# Patient Record
Sex: MALE | Race: BLACK OR AFRICAN AMERICAN | NOT HISPANIC OR LATINO | Employment: OTHER | URBAN - METROPOLITAN AREA
[De-identification: names, ages, dates, MRNs, and addresses within clinical notes are randomized per-mention and may not be internally consistent; named-entity substitution may affect disease eponyms.]

---

## 2017-01-13 ENCOUNTER — OFFICE VISIT (OUTPATIENT)
Dept: UROLOGY | Facility: CLINIC | Age: 57
End: 2017-01-13
Payer: MEDICARE

## 2017-01-13 VITALS — DIASTOLIC BLOOD PRESSURE: 80 MMHG | SYSTOLIC BLOOD PRESSURE: 120 MMHG | HEART RATE: 71 BPM

## 2017-01-13 DIAGNOSIS — N31.9 NEUROGENIC BLADDER: Primary | ICD-10-CM

## 2017-01-13 PROCEDURE — 87088 URINE BACTERIA CULTURE: CPT

## 2017-01-13 PROCEDURE — 1159F MED LIST DOCD IN RCRD: CPT | Mod: S$GLB,,, | Performed by: UROLOGY

## 2017-01-13 PROCEDURE — 3079F DIAST BP 80-89 MM HG: CPT | Mod: S$GLB,,, | Performed by: UROLOGY

## 2017-01-13 PROCEDURE — 87086 URINE CULTURE/COLONY COUNT: CPT

## 2017-01-13 PROCEDURE — 99999 PR PBB SHADOW E&M-EST. PATIENT-LVL III: CPT | Mod: PBBFAC,,, | Performed by: UROLOGY

## 2017-01-13 PROCEDURE — 99213 OFFICE O/P EST LOW 20 MIN: CPT | Mod: S$GLB,,, | Performed by: UROLOGY

## 2017-01-13 PROCEDURE — 87186 SC STD MICRODIL/AGAR DIL: CPT

## 2017-01-13 PROCEDURE — 87077 CULTURE AEROBIC IDENTIFY: CPT

## 2017-01-13 PROCEDURE — 3074F SYST BP LT 130 MM HG: CPT | Mod: S$GLB,,, | Performed by: UROLOGY

## 2017-01-13 RX ORDER — OMEPRAZOLE 20 MG/1
CAPSULE, DELAYED RELEASE ORAL
Refills: 1 | COMMUNITY
Start: 2016-12-17

## 2017-01-13 RX ORDER — COLISTIMETHATE SODIUM 150 MG/1
INJECTION, POWDER, LYOPHILIZED, FOR SOLUTION INTRAMUSCULAR; INTRAVENOUS
COMMUNITY
Start: 2016-12-02 | End: 2017-01-13

## 2017-01-13 RX ORDER — LEVOCETIRIZINE DIHYDROCHLORIDE 5 MG/1
TABLET, FILM COATED ORAL
COMMUNITY
Start: 2017-01-10
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
## 2017-01-13 NOTE — LETTER
January 13, 2017      Eveline Parker PA-C  [address] [facility] [address]           [facility] - Urology 4th Floor  [address]  [facility] [address]  Phone: [phone]          Patient: Vicente Kapoor   MR Number: [id number]   YOB: 1960   Date of Visit: 1/13/2017       Dear Eveline Parker:    Thank you for referring Vicente Kapoor to me for evaluation. Attached you will find relevant portions of my assessment and plan of care.    If you have questions, please do not hesitate to call me. I look forward to following Vicente Kapoor along with you.    Sincerely,    Rosa Isela Ta MD    Enclosure  CC:  No Recipients    If you would like to receive this communication electronically, please contact [email or web address] or [phone] to request more information on [facility] [employer] access.    For providers and/or their staff who would like to refer a patient to [facility], please contact us through our one-stop-shop provider referral line, [facility] , at [phone].    If you feel you have received this communication in error or would no longer like to receive these types of communications, please e-mail [email or web address]

## 2017-01-16 ENCOUNTER — TELEPHONE (OUTPATIENT)
Dept: UROLOGY | Facility: CLINIC | Age: 57
End: 2017-01-16

## 2017-01-16 DIAGNOSIS — N31.9 NEUROGENIC BLADDER: Primary | ICD-10-CM

## 2017-01-16 LAB — BACTERIA UR CULT: NORMAL

## 2017-01-17 NOTE — TELEPHONE ENCOUNTER
Called the patient and he states he is asymptomatic.  He collected the urine from the leg bag.  Since he is asymptomatic, will not treat the Pseudomonas.      He is scheduled on 2/7/17 for cysto Botox injection    [facility] [phone] ext [phone]    Need to have the catheter changed on Jan 30th which is early but he needs a new catheter and a urine culture sent from the new catheter in anticipation of his procedure.

## 2017-01-20 RX ORDER — SODIUM POLYSTYRENE SULFONATE 15 G/60ML
SUSPENSION ORAL; RECTAL
Qty: 2365 ML | Refills: 0 | Status: SHIPPED | OUTPATIENT
Start: 2017-01-20 | End: 2017-04-06 | Stop reason: SDUPTHER

## 2017-01-30 ENCOUNTER — ANESTHESIA EVENT (OUTPATIENT)
Dept: SURGERY | Facility: HOSPITAL | Age: 57
End: 2017-01-30
Payer: MEDICARE

## 2017-01-30 RX ORDER — ASPIRIN 81 MG/1
81 TABLET ORAL DAILY
COMMUNITY
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
## 2017-02-06 ENCOUNTER — TELEPHONE (OUTPATIENT)
Dept: UROLOGY | Facility: CLINIC | Age: 57
End: 2017-02-06

## 2017-02-06 NOTE — TELEPHONE ENCOUNTER
Called pt to confirm arrival time of 11:30am for procedure. Gave pt NPO instructions and gave pt opportunity to ask questions. Pt verbalized understanding.

## 2017-02-07 ENCOUNTER — ANESTHESIA (OUTPATIENT)
Dept: SURGERY | Facility: HOSPITAL | Age: 57
End: 2017-02-07
Payer: MEDICARE

## 2017-02-07 ENCOUNTER — SURGERY (OUTPATIENT)
Age: 57
End: 2017-02-07

## 2017-02-07 ENCOUNTER — HOSPITAL ENCOUNTER (OUTPATIENT)
Facility: HOSPITAL | Age: 57
Discharge: HOME | End: 2017-02-07
Attending: UROLOGY | Admitting: UROLOGY
Payer: MEDICARE

## 2017-02-07 DIAGNOSIS — N31.9 NEUROGENIC BLADDER: ICD-10-CM

## 2017-02-07 LAB — POCT GLUCOSE: 132 MG/DL (ref 70–110)

## 2017-02-07 PROCEDURE — 71000044 HC DOSC ROUTINE RECOVERY FIRST HOUR: Performed by: UROLOGY

## 2017-02-07 PROCEDURE — 63600175 PHARM REV CODE 636 W HCPCS: Performed by: STUDENT IN AN ORGANIZED HEALTH CARE EDUCATION/TRAINING PROGRAM

## 2017-02-07 PROCEDURE — 63600175 PHARM REV CODE 636 W HCPCS: Performed by: NURSE ANESTHETIST, CERTIFIED REGISTERED

## 2017-02-07 PROCEDURE — 27200971 HC CYSTO SUPPLY II (SCOPE PRCDR.): Performed by: UROLOGY

## 2017-02-07 PROCEDURE — 25000003 PHARM REV CODE 250: Performed by: NURSE ANESTHETIST, CERTIFIED REGISTERED

## 2017-02-07 PROCEDURE — 52287 CYSTOSCOPY CHEMODENERVATION: CPT | Mod: ,,, | Performed by: UROLOGY

## 2017-02-07 PROCEDURE — 71000015 HC POSTOP RECOV 1ST HR: Performed by: UROLOGY

## 2017-02-07 PROCEDURE — 27200921 HC BAG, CYSTO DRAINAGE: Performed by: UROLOGY

## 2017-02-07 PROCEDURE — D9220A PRA ANESTHESIA: Mod: ANES,,, | Performed by: ANESTHESIOLOGY

## 2017-02-07 PROCEDURE — 37000009 HC ANESTHESIA EA ADD 15 MINS: Performed by: UROLOGY

## 2017-02-07 PROCEDURE — 63600175 PHARM REV CODE 636 W HCPCS: Performed by: UROLOGY

## 2017-02-07 PROCEDURE — 25000003 PHARM REV CODE 250: Performed by: STUDENT IN AN ORGANIZED HEALTH CARE EDUCATION/TRAINING PROGRAM

## 2017-02-07 PROCEDURE — 37000008 HC ANESTHESIA 1ST 15 MINUTES: Performed by: UROLOGY

## 2017-02-07 PROCEDURE — 51705 CHANGE OF BLADDER TUBE: CPT | Mod: 51,,, | Performed by: UROLOGY

## 2017-02-07 PROCEDURE — D9220A PRA ANESTHESIA: Mod: CRNA,,, | Performed by: NURSE ANESTHETIST, CERTIFIED REGISTERED

## 2017-02-07 PROCEDURE — 36000707: Performed by: UROLOGY

## 2017-02-07 PROCEDURE — 27201029: Performed by: UROLOGY

## 2017-02-07 PROCEDURE — 36000706: Performed by: UROLOGY

## 2017-02-07 RX ORDER — OXYCODONE AND ACETAMINOPHEN 10; 325 MG/1; MG/1
1 TABLET ORAL EVERY 4 HOURS PRN
Status: DISCONTINUED | OUTPATIENT
Start: 2017-02-07 | End: 2017-02-07 | Stop reason: HOSPADM

## 2017-02-07 RX ORDER — PROPOFOL 10 MG/ML
VIAL (ML) INTRAVENOUS
Status: DISCONTINUED | OUTPATIENT
Start: 2017-02-07 | End: 2017-02-07

## 2017-02-07 RX ORDER — SODIUM CHLORIDE 9 MG/ML
INJECTION, SOLUTION INTRAVENOUS CONTINUOUS PRN
Status: DISCONTINUED | OUTPATIENT
Start: 2017-02-07 | End: 2017-02-07

## 2017-02-07 RX ORDER — FENTANYL CITRATE 50 UG/ML
INJECTION, SOLUTION INTRAMUSCULAR; INTRAVENOUS
Status: DISCONTINUED | OUTPATIENT
Start: 2017-02-07 | End: 2017-02-07

## 2017-02-07 RX ORDER — OXYCODONE AND ACETAMINOPHEN 10; 325 MG/1; MG/1
TABLET ORAL
Status: DISCONTINUED
Start: 2017-02-07 | End: 2017-02-07 | Stop reason: HOSPADM

## 2017-02-07 RX ORDER — HEPARIN 100 UNIT/ML
500 SYRINGE INTRAVENOUS ONCE
Status: COMPLETED | OUTPATIENT
Start: 2017-02-07 | End: 2017-02-07

## 2017-02-07 RX ORDER — OXYCODONE AND ACETAMINOPHEN 10; 325 MG/1; MG/1
1 TABLET ORAL EVERY 4 HOURS PRN
Qty: 14 TABLET | Refills: 0 | Status: SHIPPED | OUTPATIENT
Start: 2017-02-07

## 2017-02-07 RX ORDER — MIDAZOLAM HYDROCHLORIDE 1 MG/ML
INJECTION, SOLUTION INTRAMUSCULAR; INTRAVENOUS
Status: DISCONTINUED | OUTPATIENT
Start: 2017-02-07 | End: 2017-02-07

## 2017-02-07 RX ORDER — PROPOFOL 10 MG/ML
VIAL (ML) INTRAVENOUS CONTINUOUS PRN
Status: DISCONTINUED | OUTPATIENT
Start: 2017-02-07 | End: 2017-02-07

## 2017-02-07 RX ORDER — POLYETHYLENE GLYCOL 3350 17 G/17G
17 POWDER, FOR SOLUTION ORAL DAILY
Qty: 30 PACKET | Refills: 0 | Status: SHIPPED | OUTPATIENT
Start: 2017-02-07

## 2017-02-07 RX ORDER — CIPROFLOXACIN 500 MG/1
500 TABLET ORAL 2 TIMES DAILY
Qty: 6 TABLET | Refills: 0 | Status: SHIPPED | OUTPATIENT
Start: 2017-02-07 | End: 2017-02-10

## 2017-02-07 RX ADMIN — GENTAMICIN SULFATE 240 MG: 40 INJECTION, SOLUTION INTRAMUSCULAR; INTRAVENOUS at 03:02

## 2017-02-07 RX ADMIN — PROPOFOL 125 MCG/KG/MIN: 10 INJECTION, EMULSION INTRAVENOUS at 03:02

## 2017-02-07 RX ADMIN — OXYCODONE HYDROCHLORIDE AND ACETAMINOPHEN 1 TABLET: 10; 325 TABLET ORAL at 05:02

## 2017-02-07 RX ADMIN — CEFTRIAXONE 1 G: 1 INJECTION, SOLUTION INTRAVENOUS at 03:02

## 2017-02-07 RX ADMIN — MIDAZOLAM HYDROCHLORIDE 2 MG: 1 INJECTION, SOLUTION INTRAMUSCULAR; INTRAVENOUS at 03:02

## 2017-02-07 RX ADMIN — HEPARIN 500 UNITS: 100 SYRINGE at 05:02

## 2017-02-07 RX ADMIN — ONABOTULINUMTOXINA 300 UNITS: 100 INJECTION, POWDER, LYOPHILIZED, FOR SOLUTION INTRADERMAL; INTRAMUSCULAR at 04:02

## 2017-02-07 RX ADMIN — FENTANYL CITRATE 50 MCG: 50 INJECTION, SOLUTION INTRAMUSCULAR; INTRAVENOUS at 03:02

## 2017-02-07 RX ADMIN — PROPOFOL 30 MG: 10 INJECTION, EMULSION INTRAVENOUS at 03:02

## 2017-02-07 RX ADMIN — FENTANYL CITRATE 50 MCG: 50 INJECTION, SOLUTION INTRAMUSCULAR; INTRAVENOUS at 04:02

## 2017-02-07 RX ADMIN — SODIUM CHLORIDE: 0.9 INJECTION, SOLUTION INTRAVENOUS at 03:02

## 2017-02-07 NOTE — DISCHARGE INSTRUCTIONS

## 2017-02-07 NOTE — INTERVAL H&P NOTE
The patient has been examined and the H&P has been reviewed:    I concur with the findings and no changes have occurred since H&P was written.    Anesthesia/Surgery risks, benefits and alternative options discussed and understood by patient/family.          Active Hospital Problems    Diagnosis  POA    Neurogenic bladder [N31.9]  Yes      Resolved Hospital Problems    Diagnosis Date Resolved POA   No resolved problems to display.     Patient seen in holding.  No changes in clinical condition.  Proceed with planned procedure.

## 2017-02-07 NOTE — DISCHARGE SUMMARY
"[facility]  Discharge Note  Short Stay    Admit Date: 2/7/2017    Discharge Date and Time: 02/07/2017 4:47 PM     Attending Physician: Rosa Isela Ta MD     Discharge Provider: Nomi Flower MD    Diagnoses:  Active Hospital Problems    Diagnosis  POA    *Neurogenic bladder [N31.9]  Yes    HTN (hypertension) [I10]  Yes    DM (diabetes mellitus) [E11.9]  Yes      Resolved Hospital Problems    Diagnosis Date Resolved POA   No resolved problems to display.       Discharged Condition: good    Hospital Course: Patient was admitted for cystoscopy with botox injections and tolerated the procedure well with no complications.    Final Diagnoses: Same as principal problem.    Disposition: Home or Self Care    Follow up/Patient Instructions:    Medications:  Reconciled Home Medications:   Current Discharge Medication List      START taking these medications    Details   ciprofloxacin HCl (CIPRO) 500 MG tablet Take 1 tablet (500 mg total) by mouth 2 (two) times daily.  Qty: 6 tablet, Refills: 0      !! oxycodone-acetaminophen (PERCOCET)  mg per tablet Take 1 tablet by mouth every 4 (four) hours as needed for Pain.  Qty: 14 tablet, Refills: 0      polyethylene glycol (GLYCOLAX) 17 gram PwPk Take 17 g by mouth once daily.  Qty: 30 packet, Refills: 0       !! - Potential duplicate medications found. Please discuss with provider.      CONTINUE these medications which have NOT CHANGED    Details   amlodipine (NORVASC) 10 MG tablet TK 1 T PO QD  Refills: 3      aspirin (ECOTRIN) 81 MG EC tablet Take 81 mg by mouth once daily.      baclofen (LIORESAL) 10 MG tablet TK 1 T PO BID  Refills: 1      BD ULTRA-FINE PHUONG PEN NEEDLES 32 gauge x 5/32" Ndle INJECTING TID  Refills: 2      famotidine (PEPCID) 20 MG tablet TK 1 T PO HS  Refills: 1      gabapentin (NEURONTIN) 100 MG capsule TK 3 CS PO TID  Refills: 0      hydrochlorothiazide (MICROZIDE) 12.5 mg capsule TAKE 1 CAPSULE(12.5 MG) BY MOUTH EVERY DAY  Qty: 90 " capsule, Refills: 11    Comments: **Patient requests 90 days supply**      KIONEX, WITH SORBITOL, 15-19.3 gram/60 mL Susp TAKE 60 ML BY MOUTH TWICE DAILY  Qty: 2365 mL, Refills: 0      levocetirizine (XYZAL) 5 MG tablet       MYRBETRIQ 25 mg Tb24 ER tablet TAKE 1 T PO D  Refills: 11      nitrofurantoin, macrocrystal-monohydrate, (MACROBID) 100 MG capsule TK 2 C PO D WITH FOOD.  Refills: 11      omeprazole (PRILOSEC) 20 MG capsule TK 1 C PO QD 30 MIN B JAISON  Refills: 1      !! oxycodone-acetaminophen (PERCOCET)  mg per tablet TK 1 T PO  Q 6 H PRN  Refills: 0      trazodone (DESYREL) 50 MG tablet TK 1 T PO QD HS PRF INSOMNIA  Refills: 1      oxybutynin (DITROPAN XL) 15 MG TR24 Refills: 0       !! - Potential duplicate medications found. Please discuss with provider.          Discharge Procedure Orders  Diet general     Activity as tolerated     Call MD for:  persistent nausea and vomiting or diarrhea     Call MD for:  severe uncontrolled pain     Call MD for:   Order Comments: Temperature > 101F       Follow-up Information     Follow up with Rosa Isela Ta MD In 2 weeks.    Specialty:  Urology    Why:   for f/u botox injections    Contact information:    [address]  [facility] [address] [phone]          Nomi Flower MD    Agree with the above.

## 2017-02-07 NOTE — TRANSFER OF CARE
"Anesthesia Transfer of Care Note    Patient: Vicente Kapoor    Procedure(s) Performed: Procedure(s) (LRB):  CYSTOSCOPY (N/A)  INJECTION-BOTOX (N/A)    Patient location: PACU    Anesthesia Type: general    Transport from OR: Transported from OR on room air with adequate spontaneous ventilation    Post pain: adequate analgesia    Post assessment: no apparent anesthetic complications and tolerated procedure well    Post vital signs: stable    Level of consciousness: awake, alert and oriented    Nausea/Vomiting: no nausea/vomiting    Complications: none          Last vitals:   Visit Vitals    BP (!) 146/81 (BP Location: Right arm, Patient Position: Lying, BP Method: Automatic)    Pulse 68    Temp 36.7 °C (98 °F) (Oral)    Resp 18    Ht 5' 11" (1.803 m)    Wt 74.8 kg (165 lb)    SpO2 95%    BMI 23.01 kg/m2     "

## 2017-02-07 NOTE — ANESTHESIA POSTPROCEDURE EVALUATION
"Anesthesia Post Evaluation    Patient: Vicente Kapoor    Procedure(s) Performed: Procedure(s) (LRB):  CYSTOSCOPY (N/A)  INJECTION-BOTOX (N/A)    Final Anesthesia Type: general  Patient location during evaluation: PACU  Patient participation: Yes- Able to Participate  Level of consciousness: awake and alert  Post-procedure vital signs: reviewed and stable  Pain management: adequate  Airway patency: patent  PONV status at discharge: No PONV  Anesthetic complications: no      Cardiovascular status: blood pressure returned to baseline  Respiratory status: unassisted  Hydration status: euvolemic  Follow-up not needed.        Visit Vitals    /77    Pulse 88    Temp 36.3 °C (97.3 °F) (Temporal)    Resp 20    Ht 5' 11" (1.803 m)    Wt 74.8 kg (165 lb)    SpO2 99%    BMI 23.01 kg/m2       Pain/Bryce Score: Pain Assessment Performed: Yes (2/7/2017  4:30 PM)  Presence of Pain: complains of pain/discomfort (2/7/2017  4:30 PM)  Bryce Score: 10 (2/7/2017  4:30 PM)  Modified Bryce Score: 19 (2/7/2017  4:30 PM)      "

## 2017-02-07 NOTE — IP AVS SNAPSHOT
[facility]  [address]  [facility] [address]  Phone: [phone]           Patient Discharge Instructions     Our goal is to set you up for success. This packet includes information on your condition, medications, and your home care. It will help you to care for yourself so you don't get sicker and need to go back to the hospital.     Please ask your nurse if you have any questions.        There are many details to remember when preparing to leave the hospital. Here is what you will need to do:    1. Take your medicine. If you are prescribed medications, review your Medication List in the following pages. You may have new medications to  at the pharmacy and others that you'll need to stop taking. Review the instructions for how and when to take your medications. Talk with your doctor or nurses if you are unsure of what to do.     2. Go to your follow-up appointments. Specific follow-up information is listed in the following pages. Your may be contacted by a transition nurse or clinical provider about future appointments. Be sure we have all of the phone numbers to reach you, if needed. Please contact your provider's office if you are unable to make an appointment.     3. Watch for warning signs. Your doctor or nurse will give you detailed warning signs to watch for and when to call for assistance. These instructions may also include educational information about your condition. If you experience any of warning signs to your health, call your doctor.               [facility]  Unless otherwise directed by your provider, please contact [facility], our nurse care line that is available for 24/7 assistance.     [phone] (toll-free)    Registered nurses in the [facility] provide clinical advisement, health education, appointment booking, and other advisory services.                    ** Verify the list of medication(s) below is accurate and up  to date. Carry this with you in case of emergency. If your medications have changed, please notify your healthcare provider.             Medication List      START taking these medications        Additional Info                      ciprofloxacin HCl 500 MG tablet   Commonly known as:  CIPRO   Quantity:  6 tablet   Refills:  0   Dose:  500 mg    Instructions:  Take 1 tablet (500 mg total) by mouth 2 (two) times daily.     Begin Date    AM    Noon    PM    Bedtime       polyethylene glycol 17 gram Pwpk   Commonly known as:  GLYCOLAX   Quantity:  30 packet   Refills:  0   Dose:  17 g    Instructions:  Take 17 g by mouth once daily.     Begin Date    AM    Noon    PM    Bedtime         CHANGE how you take these medications        Additional Info                      * oxycodone-acetaminophen  mg per tablet   Commonly known as:  PERCOCET   Refills:  0   What changed:  Another medication with the same name was added. Make sure you understand how and when to take each.    Last time this was given:  1 tablet on 2/7/2017  5:18 PM   Instructions:  TK 1 T PO  Q 6 H PRN     Begin Date    AM    Noon    PM    Bedtime       * oxycodone-acetaminophen  mg per tablet   Commonly known as:  PERCOCET   Quantity:  14 tablet   Refills:  0   Dose:  1 tablet   What changed:  You were already taking a medication with the same name, and this prescription was added. Make sure you understand how and when to take each.    Last time this was given:  1 tablet on 2/7/2017  5:18 PM   Instructions:  Take 1 tablet by mouth every 4 (four) hours as needed for Pain.     Begin Date    AM    Noon    PM    Bedtime       * Notice:  This list has 2 medication(s) that are the same as other medications prescribed for you. Read the directions carefully, and ask your doctor or other care provider to review them with you.      CONTINUE taking these medications        Additional Info                      amlodipine 10 MG tablet   Commonly known as:   "NORVASC   Refills:  3    Instructions:  TK 1 T PO QD     Begin Date    AM    Noon    PM    Bedtime       aspirin 81 MG EC tablet   Commonly known as:  ECOTRIN   Refills:  0   Dose:  81 mg    Instructions:  Take 81 mg by mouth once daily.     Begin Date    AM    Noon    PM    Bedtime       baclofen 10 MG tablet   Commonly known as:  LIORESAL   Refills:  1    Instructions:  TK 1 T PO BID     Begin Date    AM    Noon    PM    Bedtime       BD ULTRA-FINE PHUONG PEN NEEDLES 32 gauge x 5/32" Ndle   Refills:  2   Generic drug:  pen needle, diabetic    Instructions:  INJECTING TID     Begin Date    AM    Noon    PM    Bedtime       famotidine 20 MG tablet   Commonly known as:  PEPCID   Refills:  1    Instructions:  TK 1 T PO HS     Begin Date    AM    Noon    PM    Bedtime       gabapentin 100 MG capsule   Commonly known as:  NEURONTIN   Refills:  0    Instructions:  TK 3 CS PO TID     Begin Date    AM    Noon    PM    Bedtime       hydrochlorothiazide 12.5 mg capsule   Commonly known as:  MICROZIDE   Quantity:  90 capsule   Refills:  11   Comments:  **Patient requests 90 days supply**    Instructions:  TAKE 1 CAPSULE(12.5 MG) BY MOUTH EVERY DAY     Begin Date    AM    Noon    PM    Bedtime       KIONEX (WITH SORBITOL) 15-19.3 gram/60 mL Susp   Quantity:  2365 mL   Refills:  0   Generic drug:  sodium polystyrene sulfon-sorb    Instructions:  TAKE 60 ML BY MOUTH TWICE DAILY     Begin Date    AM    Noon    PM    Bedtime       levocetirizine 5 MG tablet   Commonly known as:  XYZAL   Refills:  0      Begin Date    AM    Noon    PM    Bedtime       MYRBETRIQ 25 mg Tb24 ER tablet   Refills:  11   Generic drug:  mirabegron    Instructions:  TAKE 1 T PO D     Begin Date    AM    Noon    PM    Bedtime       nitrofurantoin (macrocrystal-monohydrate) 100 MG capsule   Commonly known as:  MACROBID   Refills:  11    Instructions:  TK 2 C PO D WITH FOOD.     Begin Date    AM    Noon    PM    Bedtime       omeprazole 20 MG capsule   Commonly " known as:  PRILOSEC   Refills:  1    Instructions:  TK 1 C PO QD 30 MIN B JAISON     Begin Date    AM    Noon    PM    Bedtime       oxybutynin 15 MG Tr24   Commonly known as:  DITROPAN XL   Refills:  0      Begin Date    AM    Noon    PM    Bedtime       trazodone 50 MG tablet   Commonly known as:  DESYREL   Refills:  1    Instructions:  TK 1 T PO QD HS PRF INSOMNIA     Begin Date    AM    Noon    PM    Bedtime            Where to Get Your Medications      You can get these medications from any pharmacy     Bring a paper prescription for each of these medications     ciprofloxacin HCl 500 MG tablet    oxycodone-acetaminophen  mg per tablet    polyethylene glycol 17 gram Pwpk                  Please bring to all follow up appointments:    1. A copy of your discharge instructions.  2. All medicines you are currently taking in their original bottles.  3. Identification and insurance card.    Please arrive 15 minutes ahead of scheduled appointment time.    Please call 24 hours in advance if you must reschedule your appointment and/or time.        Your Scheduled Appointments     Feb 24, 2017  2:00 PM CST   Post OP with Rosa Isela Ta MD   [facility] Urology 4th Floor ([facility] )    [address]  [place] [address] [phone]              Follow-up Information     Follow up with Rosa Isela Ta MD In 2 weeks.    Specialty:  Urology    Why:   for f/u botox injections    Contact information:    [address] [facility] [address] [phone]          Discharge Instructions     Future Orders    Activity as tolerated     Call MD for:  persistent nausea and vomiting or diarrhea     Call MD for:  severe uncontrolled pain     Call MD for:     Comments:    Temperature > 101F    Diet general     Questions:    Total calories:      Fat restriction, if any:      Protein restriction, if any:      Na restriction, if any:      Fluid restriction:      Additional restrictions:          Discharge  Instructions         Cystoscopy    Cystoscopy is a procedure that lets your doctor look directly inside your urethra and bladder. It can be used to:  · Help diagnose a problem with your urethra, bladder, or kidneys.  · Take a sample (biopsy) of bladder or urethral tissue.  · Treat certain problems (such as removing kidney stones).  · Place a stent to bypass an obstruction.  · Take special X-rays of the kidneys.  Based on the findings, your doctor may recommend other tests or treatments.  What is a cystoscope?  A cystoscope is a telescope-like instrument that contains lenses and fiberoptics (small glass wires that make bright light). The cystoscope may be straight and rigid, or flexible to bend around curves in the urethra. The doctor may look directly into the cystoscope, or project the image onto a monitor.  Getting ready  · Ask your doctor if you should stop taking any medications prior to the procedure.  · Ask whether you should avoid eating or drinking anything after midnight before the procedure.  · Follow any other instructions your doctor gives you.  Tell your doctor before the exam if you:  · Take any medications, such as aspirin or blood thinners  · Have allergies to any medications  · Are pregnant   The procedure  Cystoscopy is done in the doctors office or hospital. The doctor and a nurse are present during the procedure. It takes only a few minutes, longer if a biopsy, X-ray, or treatment needs to be done.  During the procedure:  · You lie on an exam table on your back, knees bent and legs apart. You are covered with a drape.  · Your urethra and the area around it are washed. Anesthetic jelly may be applied to numb the urethra. Other pain medication is usually not needed. In some cases, you may be offered a mild sedative to help you relax. If a more extensive procedure is to be done, such as a biopsy or kidney stone removal, general anesthesia may be needed.  · The cystoscope is inserted. A sterile  "fluid is put into the bladder to expand it. You may feel pressure from this fluid.  · When the procedure is done, the cystoscope is removed.  After the procedure  If you had a sedative, general anesthesia, or spinal anesthesia, you must have someone drive you home. Once youre home:  · Drink plenty of fluids.  · You may have burning or light bleeding when you urinate--this is normal.  · Medications may be prescribed to ease any discomfort or prevent infection. Take these as directed.  · Call your doctor if you have heavy bleeding or blood clots, burning that lasts more than a day, a fever over 100°F  (38° C), or trouble urinating.              Primary Diagnosis     Your primary diagnosis was:  Neurogenic Bladder Disorder      Admission Information     Date & Time Provider Department CSN    2/7/2017 11:25 AM Rosa Isela Ta MD [facility] [id number]      Care Providers     Provider Role Specialty Primary office phone    Rosa Isela Ta MD Attending Provider Urology [phone]    Rosa Isela Ta MD Surgeon  Urology [phone]      Your Vitals Were     BP Pulse Temp Resp Height Weight    152/88 59 97.3 °F (36.3 °C) (Temporal) 18 5' 11" (1.803 m) 74.8 kg (165 lb)    SpO2 BMI             98% 23.01 kg/m2         Recent Lab Values        5/8/2008 6/20/2008 10/22/2008 2/13/2009                  7:00 PM  5:00 AM  2:44 PM  4:15 AM        A1C 6.2 6.0 6.2 6.0                 Allergies as of 2/7/2017     No Known Allergies      Advance Directives     An advance directive is a document which, in the event you are no longer able to make decisions for yourself, tells your healthcare team what kind of treatment you do or do not want to receive, or who you would like to make those decisions for you.  If you do not currently have an advance directive, [facility] encourages you to create one.  For more information call:  [phone] ([phone]), [phone] ([phone]),  or log on to " [email or web address].        Smoking Cessation     If you would like to quit smoking:   You may be eligible for free services if you are a [place] resident and started smoking cigarettes before September 1, 1988.  Call the Smoking Cessation Trust (SCT) toll free at (413) 523-1700 or (813) 455-8540.   Call 1-302-QUIT-NOW if you do not meet the above criteria.            Language Assistance Services     ATTENTION: Language assistance services are available, free of charge. Please call [phone].      ATENCIÓN: Si habla español, tiene a khan disposición servicios gratuitos de asistencia lingüística. Llame al [phone].     CHÚ Ý: N?u b?n nói Ti?ng Vi?t, có các d?ch v? h? tr? ngôn ng? mi?n phí dành cho b?n. G?i s? [phone].        Diabetes Discharge Instructions                                   [facility] Sign-Up     Activating your [facility] account is as easy as 1-2-3!     1) Visit [email or web address], select Sign Up Now, enter this activation code and your date of birth, then select Next.  [email or web address]  Expires: 3/24/2017  5:20 PM      2) Create a username and password to use when you visit [facility] in the future and select a security question in case you lose your password and select Next.    3) Enter your e-mail address and click Sign Up!    Additional Information  If you have questions, please e-mail [email or web address] or call [phone] to talk to our [facility] staff. Remember, [facility] is NOT to be used for urgent needs. For medical emergencies, dial 911.          [facility] complies with applicable Federal civil rights laws and does not discriminate on the basis of race, color, national origin, age, disability, or sex.

## 2017-02-07 NOTE — OP NOTE
[facility]  Operative Note    Date: 02/07/2017    Pre-Op Diagnosis:   1. Neurogenic bladder  2. Urge incontinence  3. Hypertension  4. DMII    Post-Op Diagnosis: same, urethrocutaneous fistula    Procedure(s) Performed:     1. Cystoscopy with bladder botox injection    Specimen(s): none    Staff Surgeon:  Dr. Rosa Isela Ta MD    Assistant Surgeon: Nomi Flower MD; Mary Jo Mott MD    Anesthesia: Monitored Local Anesthesia with Sedation    Indications: Vicente Kapoor is a 56 y.o. male with neurogenic bladder due to spinal cord injury suffered over 30 years ago. He has been experiencing painful urgency and urge incontinence and presents for bladder botox injections.     Findings:     1. Unable to pass scope into bladder through patient's urethra. 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   2. Cystoscopy through suprapubic vesicostomy revealed ureteral orifices in normal anatomic position bilaterally.  3. 300 units of botox injected throughout bladder with good wheals seen    Estimated Blood Loss: min    Drains: 24 Fr suprapubic catheter    Procedure in Detail:  After informed consent was obtained the patient was brought to the cystoscopy suite and placed in the supine position. SCDs were applied and working.  Anesthesia was administered.  When the patient was adequately sedated he was placed in the dorsal lithotomy position and prepped and draped in the usual sterile fashion.      A rigid cystoscope in a 22 Fr sheath was introduced into the patients's penile urethra. The scope was carefully advanced proximally until small dale of hair were seen and what appeared to be a urethrocutaneous fistula from the patient's urethra to a sacral decubitus ulcer. 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     We then decided to perform cystoscopy  through the patient's suprapubic vesicostomy site. The cystoscope passed easily. Bilateral ureteral orifices were seen in normal anatomic position. No bladder masses, stones or diverticuli were seen. There were mild trabeculations.     300 units of botox were injected into the detrusor muscle throughout the bladder. Good wheals were raised. The patient's bladder was drained and the cystoscope was removed. A 24 Fr suprapubic catheter was placed at procedure's end.     The patient tolerated the procedure well and was transferred to the recovery room in stable condition.      Disposition:  The patient will follow up with Dr. Ta in 2 weeks. He was given prescriptions for cipro, miralax, and percocet post-operatively.      Nomi Flower MD    I was present for the key aspects of the case and agree with the above note.

## 2017-02-07 NOTE — PLAN OF CARE
Problem: Patient Care Overview  Goal: Plan of Care Review  Outcome: Outcome(s) achieved Date Met:  02/07/17  Discharge instructions given and explained to patient and family with verbalization of understanding all instructions. Prescription given and explained next time and doses of each medication. Patients v/s stable, denies n/v and tolerating po, rates pain level tolerable, IV removed, and family at bedside for patient discharge home. Anesthesia and surgical consent in pt's chart at time of discharge.

## 2017-02-07 NOTE — ANESTHESIA RELEASE NOTE
"Anesthesia Release from PACU Note    Patient: Vicente Kapoor    Procedure(s) Performed: Procedure(s) (LRB):  CYSTOSCOPY (N/A)  INJECTION-BOTOX (N/A)    Anesthesia type: general    Post pain: Adequate analgesia    Post assessment: no apparent anesthetic complications    Last Vitals:   Visit Vitals    /77    Pulse 88    Temp 36.3 °C (97.3 °F) (Temporal)    Resp 20    Ht 5' 11" (1.803 m)    Wt 74.8 kg (165 lb)    SpO2 99%    BMI 23.01 kg/m2       Post vital signs: stable    Level of consciousness: awake    Nausea/Vomiting: no nausea/no vomiting    Complications: none    Airway Patency: patent    Respiratory: unassisted    Cardiovascular: stable and blood pressure at baseline    Hydration: euvolemic  "

## 2017-02-07 NOTE — PROGRESS NOTES
Spoke to iraj Corcoran to use patient port, difficulty in obtaining peripheral IV access. Port was accessed prior to admission.

## 2017-02-07 NOTE — PROGRESS NOTES
Called pharmacy in regards to scheduled vancomycin and gentamicin that was placed in tracker at 1153 and 1227 . Meds to be tubed as requested to station [phone].

## 2017-02-08 VITALS
BODY MASS INDEX: 23.1 KG/M2 | SYSTOLIC BLOOD PRESSURE: 150 MMHG | HEIGHT: 71 IN | RESPIRATION RATE: 19 BRPM | TEMPERATURE: 97 F | DIASTOLIC BLOOD PRESSURE: 80 MMHG | OXYGEN SATURATION: 99 % | WEIGHT: 165 LBS | HEART RATE: 60 BPM

## 2017-02-24 ENCOUNTER — OFFICE VISIT (OUTPATIENT)
Dept: UROLOGY | Facility: CLINIC | Age: 57
End: 2017-02-24
Payer: MEDICARE

## 2017-02-24 VITALS — BODY MASS INDEX: 23.1 KG/M2 | HEIGHT: 71 IN | WEIGHT: 165 LBS

## 2017-02-24 DIAGNOSIS — N31.9 NEUROGENIC BLADDER: Primary | ICD-10-CM

## 2017-02-24 DIAGNOSIS — N36.0 URETHROCUTANEOUS FISTULA IN MALE: ICD-10-CM

## 2017-02-24 DIAGNOSIS — G82.20 PARAPLEGIA AT T4 LEVEL: ICD-10-CM

## 2017-02-24 PROCEDURE — 99999 PR PBB SHADOW E&M-EST. PATIENT-LVL III: CPT | Mod: PBBFAC,,, | Performed by: UROLOGY

## 2017-02-24 PROCEDURE — 99024 POSTOP FOLLOW-UP VISIT: CPT | Mod: S$GLB,,, | Performed by: UROLOGY

## 2017-02-24 NOTE — PATIENT INSTRUCTIONS
OK to stop the Oxybutynin and Myrbetriq.  The oxybutynin can affect the way you think/have memory problems, so if you do one first, it should be the oxybutynin.  The Myrbetriq does not have the cognitive side effects.  Will plan to see you for follow up in 5 months.

## 2017-02-24 NOTE — MR AVS SNAPSHOT
"    [facility] - Urology 4th Floor  [address]  [facility] [address]  Phone: [phone]                  Vicente Kapoor   2017 2:00 PM   Office Visit    Description:  Male : 1960   Provider:  Rosa Isela Ta MD   Department:  [facility] - Urology 4th Floor           Reason for Visit     Post-op Evaluation                To Do List           Goals (5 Years of Data)     None      [facility]     [facility] On Call Nurse Care Line -  Assistance  Registered nurses in the [facility] provide clinical advisement, health education, appointment booking, and other advisory services.  Call for this free service at [phone].             Medications           Message regarding Medications     Verify the changes and/or additions to your medication regime listed below are the same as discussed with your clinician today.  If any of these changes or additions are incorrect, please notify your healthcare provider.             Verify that the below list of medications is an accurate representation of the medications you are currently taking.  If none reported, the list may be blank. If incorrect, please contact your healthcare provider. Carry this list with you in case of emergency.           Current Medications     amlodipine (NORVASC) 10 MG tablet TK 1 T PO QD    aspirin (ECOTRIN) 81 MG EC tablet Take 81 mg by mouth once daily.    baclofen (LIORESAL) 10 MG tablet TK 1 T PO BID    BD ULTRA-FINE PHUONG PEN NEEDLES 32 gauge x 5/32" Ndle INJECTING TID    famotidine (PEPCID) 20 MG tablet TK 1 T PO HS    gabapentin (NEURONTIN) 100 MG capsule TK 3 CS PO TID    hydrochlorothiazide (MICROZIDE) 12.5 mg capsule TAKE 1 CAPSULE(12.5 MG) BY MOUTH EVERY DAY    KIONEX, WITH SORBITOL, 15-19.3 gram/60 mL Susp TAKE 60 ML BY MOUTH TWICE DAILY    levocetirizine (XYZAL) 5 MG tablet     MYRBETRIQ 25 mg Tb24 ER tablet TAKE 1 T PO D    nitrofurantoin, macrocrystal-monohydrate, (MACROBID) 100 MG capsule TK 2 C PO " "D WITH FOOD.    omeprazole (PRILOSEC) 20 MG capsule TK 1 C PO QD 30 MIN B JAISON    oxybutynin (DITROPAN XL) 15 MG TR24     oxycodone-acetaminophen (PERCOCET)  mg per tablet TK 1 T PO  Q 6 H PRN    oxycodone-acetaminophen (PERCOCET)  mg per tablet Take 1 tablet by mouth every 4 (four) hours as needed for Pain.    polyethylene glycol (GLYCOLAX) 17 gram PwPk Take 17 g by mouth once daily.    trazodone (DESYREL) 50 MG tablet TK 1 T PO QD HS PRF INSOMNIA           Clinical Reference Information           Your Vitals Were     Height                   5' 11" (1.803 m)           Allergies as of 2/24/2017     No Known Allergies      Immunizations Administered on Date of Encounter - 2/24/2017     None      [facility] Sign-Up     Activating your [facility] account is as easy as 1-2-3!     1) Visit [email or web address], select Sign Up Now, enter this activation code and your date of birth, then select Next.  [email or web address]  Expires: 3/24/2017  5:20 PM      2) Create a username and password to use when you visit [facility] in the future and select a security question in case you lose your password and select Next.    3) Enter your e-mail address and click Sign Up!    Additional Information  If you have questions, please e-mail [email or web address] or call [phone] to talk to our [facility] staff. Remember, [facility] is NOT to be used for urgent needs. For medical emergencies, dial 911.         Instructions    OK to stop the Oxybutynin and Myrbetriq.  The oxybutynin can affect the way you think/have memory problems, so if you do one first, it should be the oxybutynin.  The Myrbetriq does not have the cognitive side effects.  Will plan to see you for follow up in 5 months.        Language Assistance Services     ATTENTION: Language assistance services are available, free of charge. Please call [phone].      ATENCIÓN: Si habla español, tiene a khan disposición servicios gratuitos de asistencia lingüística. Llame al " [phone].     EVON Ý: N?u b?n nói Ti?ng Vi?t, có các d?ch v? h? tr? ngôn ng? mi?n phí dành cho b?n. G?i s? [phone].         [facility] 4th [facility] complies with applicable Federal civil rights laws and does not discriminate on the basis of race, color, national origin, age, disability, or sex.

## 2017-03-09 PROBLEM — N36.0 URETHROCUTANEOUS FISTULA IN MALE: Status: ACTIVE | Noted: 2017-03-09

## 2017-03-09 PROBLEM — G82.20 PARAPLEGIA AT T4 LEVEL: Status: ACTIVE | Noted: 2017-03-09
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
## 2017-04-03 ENCOUNTER — TELEPHONE (OUTPATIENT)
Dept: UROLOGY | Facility: CLINIC | Age: 57
End: 2017-04-03

## 2017-04-03 RX ORDER — OXYBUTYNIN CHLORIDE 15 MG/1
15 TABLET, EXTENDED RELEASE ORAL DAILY
Qty: 30 TABLET | Refills: 6 | Status: SHIPPED | OUTPATIENT
Start: 2017-04-03 | End: 2017-10-27 | Stop reason: SDUPTHER

## 2017-04-03 NOTE — TELEPHONE ENCOUNTER
----- Message from Jordyn Fernando sent at 4/3/2017  9:57 AM CDT -----  Contact: Pt:[phone]  Pt states he would like to speak with  or her nurse in regard to the Botox injection.

## 2017-04-03 NOTE — TELEPHONE ENCOUNTER
Patient had called to inquire about another Botox injection.  Review of the chart reveals that he had 300 U botox on 2/7/2017.  Therefore, would not be able to re inject until May 9th.  In the mean time, the oxybutynin was refilled per pt request.

## 2017-04-06 ENCOUNTER — TELEPHONE (OUTPATIENT)
Dept: UROLOGY | Facility: CLINIC | Age: 57
End: 2017-04-06

## 2017-04-06 DIAGNOSIS — N31.9 NEUROGENIC BLADDER: Primary | ICD-10-CM

## 2017-04-06 RX ORDER — SODIUM POLYSTYRENE SULFONATE 15 G/60ML
SUSPENSION ORAL; RECTAL
Qty: 2365 ML | Refills: 0 | Status: SHIPPED | OUTPATIENT
Start: 2017-04-06 | End: 2017-06-26 | Stop reason: SDUPTHER

## 2017-04-27 ENCOUNTER — ANESTHESIA EVENT (OUTPATIENT)
Dept: SURGERY | Facility: HOSPITAL | Age: 57
End: 2017-04-27
Payer: MEDICARE
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
## 2017-05-03 ENCOUNTER — OFFICE VISIT (OUTPATIENT)
Dept: UROLOGY | Facility: CLINIC | Age: 57
End: 2017-05-03
Payer: MEDICARE

## 2017-05-03 VITALS
BODY MASS INDEX: 23.1 KG/M2 | DIASTOLIC BLOOD PRESSURE: 86 MMHG | HEART RATE: 72 BPM | HEIGHT: 71 IN | SYSTOLIC BLOOD PRESSURE: 121 MMHG | WEIGHT: 165 LBS

## 2017-05-03 DIAGNOSIS — N30.90 BLADDER INFECTION: Primary | ICD-10-CM

## 2017-05-03 DIAGNOSIS — N31.9 NEUROGENIC BLADDER: ICD-10-CM

## 2017-05-03 PROCEDURE — 99213 OFFICE O/P EST LOW 20 MIN: CPT | Mod: S$GLB,,, | Performed by: UROLOGY

## 2017-05-03 PROCEDURE — 1160F RVW MEDS BY RX/DR IN RCRD: CPT | Mod: S$GLB,,, | Performed by: UROLOGY

## 2017-05-03 PROCEDURE — 3074F SYST BP LT 130 MM HG: CPT | Mod: S$GLB,,, | Performed by: UROLOGY

## 2017-05-03 PROCEDURE — 99999 PR PBB SHADOW E&M-EST. PATIENT-LVL III: CPT | Mod: PBBFAC,,, | Performed by: UROLOGY

## 2017-05-03 PROCEDURE — 3079F DIAST BP 80-89 MM HG: CPT | Mod: S$GLB,,, | Performed by: UROLOGY

## 2017-05-03 RX ORDER — CIPROFLOXACIN 500 MG/1
500 TABLET ORAL 2 TIMES DAILY
Qty: 14 TABLET | Refills: 0 | Status: SHIPPED | OUTPATIENT
Start: 2017-05-03 | End: 2017-05-10

## 2017-05-03 NOTE — PROGRESS NOTES
CHIEF COMPLAINT:    Mr. Kapoor is a 56 y.o. male presenting for a neurogenic bladder in a patient with history of spinal cord injury    PRESENTING ILLNESS:    Vicente Kapoor is a 56 y.o. male who returns for follow up.  He was last injected on 2/7/2017 and feels the bladder spasms returning.  He had no adverse effect from the previous Botox therapy though he had some ongoing bladder spasms initially and wanted to know if the dose could be increased.  He has not had Botox for any other indication.  He will have Home health change the catheter tomorrow in anticipation for his upcoming Botox injection    REVIEW OF SYSTEMS:    Review of Systems   Constitutional: Negative.    HENT: Negative.    Eyes: Negative.    Respiratory: Negative.    Cardiovascular: Negative.    Gastrointestinal: Positive for constipation.   Genitourinary: Positive for urgency.   Musculoskeletal: Positive for back pain and joint pain.   Skin: Negative.    Neurological:        Paraplegic, uses a motorized wheel chair   Endo/Heme/Allergies: Does not bruise/bleed easily.   Psychiatric/Behavioral: Negative.      PATIENT HISTORY:    Past Medical History:   Diagnosis Date    Diabetes mellitus     Hypertension     Osteomyelitis        Past Surgical History:   Procedure Laterality Date    below the knee amputation Right     COLOSTOMY Left     PENILE PROSTHESIS  REMOVAL      PENILE PROSTHESIS IMPLANT      SUPRAPUBIC TUBE PLACEMENT  2008       History reviewed. No pertinent family history.    Social History    Marital status: Single     Social History Main Topics    Smoking status: Former Smoker    Smokeless tobacco: Not on file    Alcohol use Yes      Comment: occasionally    Drug use: No    Sexual activity: Not on file       Allergies:  Review of patient's allergies indicates no known allergies.    Medications:  Outpatient Encounter Prescriptions as of 5/3/2017   Medication Sig Dispense Refill    amlodipine (NORVASC) 10 MG tablet TK 1 T PO QD   "3    aspirin (ECOTRIN) 81 MG EC tablet Take 81 mg by mouth once daily.      baclofen (LIORESAL) 10 MG tablet TK 1 T PO BID  1    BD ULTRA-FINE PHUONG PEN NEEDLES 32 gauge x 5/32" Ndle INJECTING TID  2    ciprofloxacin HCl (CIPRO) 500 MG tablet Take 1 tablet (500 mg total) by mouth 2 (two) times daily. Take 2 hours before or after antacid, calcium, magnesium, or iron supplementation 14 tablet 0    famotidine (PEPCID) 20 MG tablet TK 1 T PO HS  1    gabapentin (NEURONTIN) 100 MG capsule TK 3 CS PO TID  0    hydrochlorothiazide (MICROZIDE) 12.5 mg capsule TAKE 1 CAPSULE(12.5 MG) BY MOUTH EVERY DAY 90 capsule 11    KIONEX, WITH SORBITOL, 15-19.3 gram/60 mL Susp SHAKE LIQUID AND TAKE 60 ML BY MOUTH TWICE DAILY 2365 mL 0    levocetirizine (XYZAL) 5 MG tablet       MYRBETRIQ 25 mg Tb24 ER tablet TAKE 1 T PO D  11    nitrofurantoin, macrocrystal-monohydrate, (MACROBID) 100 MG capsule TK 2 C PO D WITH FOOD.  11    omeprazole (PRILOSEC) 20 MG capsule TK 1 C PO QD 30 MIN B JAISON  1    oxybutynin (DITROPAN XL) 15 MG TR24 Take 1 tablet (15 mg total) by mouth once daily. 30 tablet 6    oxycodone-acetaminophen (PERCOCET)  mg per tablet TK 1 T PO  Q 6 H PRN  0    oxycodone-acetaminophen (PERCOCET)  mg per tablet Take 1 tablet by mouth every 4 (four) hours as needed for Pain. 14 tablet 0    polyethylene glycol (GLYCOLAX) 17 gram PwPk Take 17 g by mouth once daily. 30 packet 0    trazodone (DESYREL) 50 MG tablet TK 1 T PO QD HS PRF INSOMNIA  1     No facility-administered encounter medications on file as of 5/3/2017.          PHYSICAL EXAMINATION:    The patient generally appears in good health, is appropriately interactive, and is in no apparent distress.    Skin: No lesions.    Mental: Cooperative with normal affect.    Neuro: Grossly intact.    HEENT: Normal. No evidence of lymphadenopathy.    Chest: normal inspiratory effort.    Abdomen: Soft, non-tender.. No evidence of flank discomfort. No evidence of " inguinal hernia.    Extremities: No clubbing, cyanosis.  He has decreased muscle tone on the left, status post BKA on the right side.      IMPRESSION:    Encounter Diagnoses   Name Primary?    Bladder infection Yes    Neurogenic bladder        PLAN:    1. Consent signed for cystoscopy Botox injection of the bladder.  Plan 300 U as this is the limit.  Will plan to inject a little more around the trigone  2.  He will have home health change the suprapubic tube and send a culture from the new catheter  3.  cipro given empirically (the last time he had pseudomonas, sensitive to Cipro.)

## 2017-05-08 ENCOUNTER — TELEPHONE (OUTPATIENT)
Dept: UROLOGY | Facility: CLINIC | Age: 57
End: 2017-05-08

## 2017-05-08 NOTE — TELEPHONE ENCOUNTER
Called pt to confirm 5:30am arrival time for procedure. Gave pt NPO instructions and gave pt opportunity to ask questions. Pt verbalized understanding.

## 2017-05-09 ENCOUNTER — ANESTHESIA (OUTPATIENT)
Dept: SURGERY | Facility: HOSPITAL | Age: 57
End: 2017-05-09
Payer: MEDICARE

## 2017-05-09 ENCOUNTER — HOSPITAL ENCOUNTER (OUTPATIENT)
Facility: HOSPITAL | Age: 57
Discharge: HOME | End: 2017-05-09
Attending: UROLOGY | Admitting: UROLOGY
Payer: MEDICARE

## 2017-05-09 ENCOUNTER — SURGERY (OUTPATIENT)
Age: 57
End: 2017-05-09

## 2017-05-09 VITALS
RESPIRATION RATE: 18 BRPM | HEART RATE: 67 BPM | SYSTOLIC BLOOD PRESSURE: 113 MMHG | DIASTOLIC BLOOD PRESSURE: 75 MMHG | BODY MASS INDEX: 23.1 KG/M2 | WEIGHT: 165 LBS | OXYGEN SATURATION: 96 % | TEMPERATURE: 98 F | HEIGHT: 71 IN

## 2017-05-09 DIAGNOSIS — N31.9 NEUROGENIC BLADDER: ICD-10-CM

## 2017-05-09 LAB
POCT GLUCOSE: 108 MG/DL (ref 70–110)
POCT GLUCOSE: 97 MG/DL (ref 70–110)

## 2017-05-09 PROCEDURE — 37000009 HC ANESTHESIA EA ADD 15 MINS: Performed by: UROLOGY

## 2017-05-09 PROCEDURE — 25000003 PHARM REV CODE 250: Performed by: UROLOGY

## 2017-05-09 PROCEDURE — 36000707: Performed by: UROLOGY

## 2017-05-09 PROCEDURE — 82962 GLUCOSE BLOOD TEST: CPT | Performed by: UROLOGY

## 2017-05-09 PROCEDURE — 36000706: Performed by: UROLOGY

## 2017-05-09 PROCEDURE — D9220A PRA ANESTHESIA: Mod: ANES,,, | Performed by: ANESTHESIOLOGY

## 2017-05-09 PROCEDURE — 63600175 PHARM REV CODE 636 W HCPCS: Performed by: NURSE ANESTHETIST, CERTIFIED REGISTERED

## 2017-05-09 PROCEDURE — 37000008 HC ANESTHESIA 1ST 15 MINUTES: Performed by: UROLOGY

## 2017-05-09 PROCEDURE — 63600175 PHARM REV CODE 636 W HCPCS: Performed by: UROLOGY

## 2017-05-09 PROCEDURE — 52287 CYSTOSCOPY CHEMODENERVATION: CPT | Mod: ,,, | Performed by: UROLOGY

## 2017-05-09 PROCEDURE — 71000015 HC POSTOP RECOV 1ST HR: Performed by: UROLOGY

## 2017-05-09 PROCEDURE — 71000033 HC RECOVERY, INTIAL HOUR: Performed by: UROLOGY

## 2017-05-09 PROCEDURE — 25000003 PHARM REV CODE 250

## 2017-05-09 PROCEDURE — 51705 CHANGE OF BLADDER TUBE: CPT | Mod: 51,,, | Performed by: UROLOGY

## 2017-05-09 PROCEDURE — D9220A PRA ANESTHESIA: Mod: CRNA,,, | Performed by: NURSE ANESTHETIST, CERTIFIED REGISTERED

## 2017-05-09 RX ORDER — MIDAZOLAM HYDROCHLORIDE 1 MG/ML
INJECTION, SOLUTION INTRAMUSCULAR; INTRAVENOUS
Status: DISCONTINUED | OUTPATIENT
Start: 2017-05-09 | End: 2017-05-09

## 2017-05-09 RX ORDER — HEPARIN 100 UNIT/ML
5 SYRINGE INTRAVENOUS ONCE
Status: COMPLETED | OUTPATIENT
Start: 2017-05-09 | End: 2017-05-09

## 2017-05-09 RX ORDER — FENTANYL CITRATE 50 UG/ML
INJECTION, SOLUTION INTRAMUSCULAR; INTRAVENOUS
Status: DISCONTINUED | OUTPATIENT
Start: 2017-05-09 | End: 2017-05-09

## 2017-05-09 RX ORDER — SODIUM CHLORIDE 9 MG/ML
INJECTION, SOLUTION INTRAVENOUS CONTINUOUS
Status: DISCONTINUED | OUTPATIENT
Start: 2017-05-09 | End: 2017-05-09 | Stop reason: HOSPADM

## 2017-05-09 RX ORDER — LIDOCAINE HYDROCHLORIDE 10 MG/ML
1 INJECTION, SOLUTION EPIDURAL; INFILTRATION; INTRACAUDAL; PERINEURAL ONCE
Status: DISCONTINUED | OUTPATIENT
Start: 2017-05-09 | End: 2017-05-09 | Stop reason: HOSPADM

## 2017-05-09 RX ORDER — PROPOFOL 10 MG/ML
VIAL (ML) INTRAVENOUS CONTINUOUS PRN
Status: DISCONTINUED | OUTPATIENT
Start: 2017-05-09 | End: 2017-05-09

## 2017-05-09 RX ORDER — HEPARIN 100 UNIT/ML
SYRINGE INTRAVENOUS
Status: COMPLETED
Start: 2017-05-09 | End: 2017-05-09

## 2017-05-09 RX ORDER — LIDOCAINE HYDROCHLORIDE 10 MG/ML
INJECTION, SOLUTION EPIDURAL; INFILTRATION; INTRACAUDAL; PERINEURAL
Status: DISCONTINUED
Start: 2017-05-09 | End: 2017-05-09 | Stop reason: HOSPADM

## 2017-05-09 RX ADMIN — FENTANYL CITRATE 50 MCG: 50 INJECTION, SOLUTION INTRAMUSCULAR; INTRAVENOUS at 07:05

## 2017-05-09 RX ADMIN — HEPARIN 500 UNITS: 100 SYRINGE at 08:05

## 2017-05-09 RX ADMIN — CEFTRIAXONE 1 G: 1 INJECTION, SOLUTION INTRAVENOUS at 06:05

## 2017-05-09 RX ADMIN — SODIUM CHLORIDE: 0.9 INJECTION, SOLUTION INTRAVENOUS at 06:05

## 2017-05-09 RX ADMIN — ONABOTULINUMTOXINA 300 UNITS: 100 INJECTION, POWDER, LYOPHILIZED, FOR SOLUTION INTRADERMAL; INTRAMUSCULAR at 07:05

## 2017-05-09 RX ADMIN — GENTAMICIN SULFATE 160 MG: 40 INJECTION, SOLUTION INTRAMUSCULAR; INTRAVENOUS at 07:05

## 2017-05-09 RX ADMIN — MIDAZOLAM HYDROCHLORIDE 2 MG: 1 INJECTION, SOLUTION INTRAMUSCULAR; INTRAVENOUS at 06:05

## 2017-05-09 RX ADMIN — PROPOFOL 100 MCG/KG/MIN: 10 INJECTION, EMULSION INTRAVENOUS at 07:05

## 2017-05-09 NOTE — ANESTHESIA RELEASE NOTE
"Anesthesia Release from PACU Note    Patient: Vicente Kapoor    Procedure(s) Performed: Procedure(s) (LRB):  CYSTOSCOPY (N/A)  INJECTION-BOTOX (N/A)  INSERTION-CATHETER-SUPRAPUBIC (N/A)    Anesthesia type: General    Post pain: Adequate analgesia    Post assessment: no apparent anesthetic complications, tolerated procedure well and no evidence of recall    Last Vitals:   Visit Vitals    /65    Pulse 86    Temp 36.9 °C (98.4 °F) (Temporal)    Resp 16    Ht 5' 11" (1.803 m)    Wt 74.8 kg (165 lb)    SpO2 95%    BMI 23.01 kg/m2       Post vital signs: stable    Level of consciousness: awake, alert  and oriented    Nausea/Vomiting: no nausea/no vomiting    Complications: none    Airway Patency: patent    Respiratory: unassisted    Cardiovascular: stable and blood pressure at baseline    Hydration: euvolemic  "
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
## 2017-05-09 NOTE — IP AVS SNAPSHOT
[facility]  [address]  [facility] [address]  Phone: [phone]           Patient Discharge Instructions   Our goal is to set you up for success. This packet includes information on your condition, medications, and your home care.  It will help you care for yourself to prevent having to return to the hospital.     Please ask your nurse if you have any questions.      There are many details to remember when preparing to leave the hospital. Here is what you will need to do:    1. Take your medicine. If you are prescribed medications, review your Medication List on the following pages. You may have new medications to  at the pharmacy and others that you'll need to stop taking. Review the instructions for how and when to take your medications. Talk with your doctor or nurses if you are unsure of what to do.     2. Go to your follow-up appointments. Specific follow-up information is listed in the following pages. Your may be contacted by a nurse or clinical provider about future appointments. Be sure we have all of the phone numbers to reach you. Please contact your provider's office if you are unable to make an appointment.     3. Watch for warning signs. Your doctor or nurse will give you detailed warning signs to watch for and when to call for assistance. These instructions may also include educational information about your condition. If you experience any of warning signs to your health, call your doctor.           [facility]  Unless otherwise directed by your provider, please   contact [facility], our nurse care line   that is available for 24/7 assistance.     [phone] (toll-free)     Registered nurses in the [facility]   provide: appointment scheduling, clinical advisement, health education, and other advisory services.                  ** Verify the list of medication(s) below is accurate and up to date. Carry this with you in case of  "emergency. If your medications have changed, please notify your healthcare provider.             Medication List      CONTINUE taking these medications        Additional Info                      amlodipine 10 MG tablet   Commonly known as:  NORVASC   Refills:  3    Instructions:  TK 1 T PO QD     Begin Date    AM    Noon    PM    Bedtime       aspirin 81 MG EC tablet   Commonly known as:  ECOTRIN   Refills:  0   Dose:  81 mg    Instructions:  Take 81 mg by mouth once daily.     Begin Date    AM    Noon    PM    Bedtime       baclofen 10 MG tablet   Commonly known as:  LIORESAL   Refills:  1    Instructions:  TK 1 T PO BID     Begin Date    AM    Noon    PM    Bedtime       BD ULTRA-FINE PHUONG PEN NEEDLES 32 gauge x 5/32" Ndle   Refills:  2   Generic drug:  pen needle, diabetic    Instructions:  INJECTING TID     Begin Date    AM    Noon    PM    Bedtime       ciprofloxacin HCl 500 MG tablet   Commonly known as:  CIPRO   Quantity:  14 tablet   Refills:  0   Dose:  500 mg    Instructions:  Take 1 tablet (500 mg total) by mouth 2 (two) times daily. Take 2 hours before or after antacid, calcium, magnesium, or iron supplementation     Begin Date    AM    Noon    PM    Bedtime       famotidine 20 MG tablet   Commonly known as:  PEPCID   Refills:  1    Instructions:  TK 1 T PO HS     Begin Date    AM    Noon    PM    Bedtime       gabapentin 100 MG capsule   Commonly known as:  NEURONTIN   Refills:  0    Instructions:  TK 3 CS PO TID     Begin Date    AM    Noon    PM    Bedtime       hydrochlorothiazide 12.5 mg capsule   Commonly known as:  MICROZIDE   Quantity:  90 capsule   Refills:  11   Comments:  **Patient requests 90 days supply**    Instructions:  TAKE 1 CAPSULE(12.5 MG) BY MOUTH EVERY DAY     Begin Date    AM    Noon    PM    Bedtime       KIONEX (WITH SORBITOL) 15-19.3 gram/60 mL Susp   Quantity:  2365 mL   Refills:  0   Generic drug:  sodium polystyrene sulfon-sorb    Instructions:  SHAKE LIQUID AND TAKE 60 ML " BY MOUTH TWICE DAILY     Begin Date    AM    Noon    PM    Bedtime       levocetirizine 5 MG tablet   Commonly known as:  XYZAL   Refills:  0      Begin Date    AM    Noon    PM    Bedtime       MYRBETRIQ 25 mg Tb24 ER tablet   Refills:  11   Generic drug:  mirabegron    Instructions:  TAKE 1 T PO D     Begin Date    AM    Noon    PM    Bedtime       nitrofurantoin (macrocrystal-monohydrate) 100 MG capsule   Commonly known as:  MACROBID   Refills:  11    Instructions:  TK 2 C PO D WITH FOOD.     Begin Date    AM    Noon    PM    Bedtime       omeprazole 20 MG capsule   Commonly known as:  PRILOSEC   Refills:  1    Instructions:  TK 1 C PO QD 30 MIN B JAISON     Begin Date    AM    Noon    PM    Bedtime       oxybutynin 15 MG Tr24   Commonly known as:  DITROPAN XL   Quantity:  30 tablet   Refills:  6   Dose:  15 mg    Instructions:  Take 1 tablet (15 mg total) by mouth once daily.     Begin Date    AM    Noon    PM    Bedtime       * oxycodone-acetaminophen  mg per tablet   Commonly known as:  PERCOCET   Refills:  0    Instructions:  TK 1 T PO  Q 6 H PRN     Begin Date    AM    Noon    PM    Bedtime       * oxycodone-acetaminophen  mg per tablet   Commonly known as:  PERCOCET   Quantity:  14 tablet   Refills:  0   Dose:  1 tablet    Instructions:  Take 1 tablet by mouth every 4 (four) hours as needed for Pain.     Begin Date    AM    Noon    PM    Bedtime       polyethylene glycol 17 gram Pwpk   Commonly known as:  GLYCOLAX   Quantity:  30 packet   Refills:  0   Dose:  17 g    Instructions:  Take 17 g by mouth once daily.     Begin Date    AM    Noon    PM    Bedtime       trazodone 50 MG tablet   Commonly known as:  DESYREL   Refills:  1    Instructions:  TK 1 T PO QD HS PRF INSOMNIA     Begin Date    AM    Noon    PM    Bedtime       * Notice:  This list has 2 medication(s) that are the same as other medications prescribed for you. Read the directions carefully, and ask your doctor or other care provider  to review them with you.               Please bring to all follow up appointments:    1. A copy of your discharge instructions.  2. All medicines you are currently taking in their original bottles.  3. Identification and insurance card.    Please arrive 15 minutes ahead of scheduled appointment time.    Please call 24 hours in advance if you must reschedule your appointment and/or time.        Your Scheduled Appointments     Jun 14, 2017  1:00 PM CDT   Post OP with Rosa Isela Ta MD   [facility] - Urology 4th Floor ([facility] [address] )    [address]  [facility] [address]   [phone]              Follow-up Information     Schedule an appointment as soon as possible for a visit with Rosa Isela Ta MD.    Specialty:  Urology    Why:  As needed    Contact information:    [address]  [facility] [address]  [phone]          Discharge Instructions     Future Orders    Activity as tolerated     Call MD for:  persistent nausea and vomiting or diarrhea     Call MD for:  severe uncontrolled pain     Call MD for:  temperature >100.4     Diet general     Questions:    Total calories:      Fat restriction, if any:      Protein restriction, if any:      Na restriction, if any:      Fluid restriction:      Additional restrictions:          Discharge Instructions         Cystoscopy    Cystoscopy is a procedure that lets your doctor look directly inside your urethra and bladder. It can be used to:  · Help diagnose a problem with your urethra, bladder, or kidneys.  · Take a sample (biopsy) of bladder or urethral tissue.  · Treat certain problems (such as removing kidney stones).  · Place a stent to bypass an obstruction.  · Take special X-rays of the kidneys.  Based on the findings, your doctor may recommend other tests or treatments.  What is a cystoscope?  A cystoscope is a telescope-like instrument that contains lenses and fiberoptics (small glass wires that make bright light). The cystoscope may  be straight and rigid, or flexible to bend around curves in the urethra. The doctor may look directly into the cystoscope, or project the image onto a monitor.  Getting ready  · Ask your doctor if you should stop taking any medications prior to the procedure.  · Ask whether you should avoid eating or drinking anything after midnight before the procedure.  · Follow any other instructions your doctor gives you.  Tell your doctor before the exam if you:  · Take any medications, such as aspirin or blood thinners  · Have allergies to any medications  · Are pregnant   The procedure  Cystoscopy is done in the doctors office or hospital. The doctor and a nurse are present during the procedure. It takes only a few minutes, longer if a biopsy, X-ray, or treatment needs to be done.  During the procedure:  · You lie on an exam table on your back, knees bent and legs apart. You are covered with a drape.  · Your urethra and the area around it are washed. Anesthetic jelly may be applied to numb the urethra. Other pain medication is usually not needed. In some cases, you may be offered a mild sedative to help you relax. If a more extensive procedure is to be done, such as a biopsy or kidney stone removal, general anesthesia may be needed.  · The cystoscope is inserted. A sterile fluid is put into the bladder to expand it. You may feel pressure from this fluid.  · When the procedure is done, the cystoscope is removed.  After the procedure  If you had a sedative, general anesthesia, or spinal anesthesia, you must have someone drive you home. Once youre home:  · Drink plenty of fluids.  · You may have burning or light bleeding when you urinate--this is normal.  · Medications may be prescribed to ease any discomfort or prevent infection. Take these as directed.  · Call your doctor if you have heavy bleeding or blood clots, burning that lasts more than a day, a fever over 100°F  (38° C), or trouble urinating.  Date Last Reviewed:  "9/8/2014  © 7626-7188 [employer]. [address] [place] [address], [place] [address]. All rights reserved. This information is not intended as a substitute for professional medical care. Always follow your healthcare professional's instructions.            Primary Diagnosis     Your primary diagnosis was:  Neurogenic Bladder Disorder      Admission Information     Date & Time Provider Department CSN    5/9/2017  5:31 AM Rosa Isela Ta MD [facility] [id number]      Care Providers     Provider Role Specialty Primary office phone    Rosa Isela Ta MD Attending Provider Urology [phone]    Rosa Isela Ta MD Surgeon  Urology [phone]      Your Vitals Were     BP Pulse Temp Resp Height Weight    109/65 86 98.4 °F (36.9 °C) (Temporal) 16 5' 11" (1.803 m) 74.8 kg (165 lb)    SpO2 BMI             95% 23.01 kg/m2         Recent Lab Values        5/8/2008 6/20/2008 10/22/2008 2/13/2009                  7:00 PM  5:00 AM  2:44 PM  4:15 AM        A1C 6.2 6.0 6.2 6.0                 Allergies as of 5/9/2017     No Known Allergies      Advance Directives     An advance directive is a document which, in the event you are no longer able to make decisions for yourself, tells your healthcare team what kind of treatment you do or do not want to receive, or who you would like to make those decisions for you.  If you do not currently have an advance directive, [facility] encourages you to create one.  For more information call:  [phone] ([phone]), [phone] ([phone]),  or log on to [email or web address].        Smoking Cessation     If you would like to quit smoking:   You may be eligible for free services if you are a [place] resident and started smoking cigarettes before September 1, 1988.  Call the Smoking Cessation Trust (SCT) toll free at (690) 855-3640 or (897) 910-6247.   Call 1-800-QUIT-NOW if you do not meet the above criteria.   Contact us via email: " [email or web address]   View our website for more information: [email or web address]        Language Assistance Services     ATTENTION: Language assistance services are available, free of charge. Please call [phone].      ATENCIÓN: Si habla chuck, tiene a khan disposición servicios gratuitos de asistencia lingüística. Llame al [phone].     CHÚ Ý: N?u b?n nói Ti?ng Vi?t, có các d?ch v? h? tr? ngôn ng? mi?n phí dành cho b?n. G?i s? [phone].        Diabetes Discharge Instructions                                    [facility] complies with applicable Federal civil rights laws and does not discriminate on the basis of race, color, national origin, age, disability, or sex.

## 2017-05-09 NOTE — DISCHARGE SUMMARY
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       Discharged Condition: good    Hospital Course: Patient was admitted for an outpatient procedure and tolerated the procedure well with no complications.    Final Diagnoses: Same as principal problem.    Disposition: Home or Self Care    Follow up/Patient Instructions:    Medications:  Reconciled Home Medications:   Current Discharge Medication List      CONTINUE these medications which have NOT CHANGED    Details   amlodipine (NORVASC) 10 MG tablet TK 1 T PO QD  Refills: 3      baclofen (LIORESAL) 10 MG tablet TK 1 T PO BID  Refills: 1      ciprofloxacin HCl (CIPRO) 500 MG tablet Take 1 tablet (500 mg total) by mouth 2 (two) times daily. Take 2 hours before or after antacid, calcium, magnesium, or iron supplementation  Qty: 14 tablet, Refills: 0    Associated Diagnoses: Bladder infection; Neurogenic bladder      famotidine (PEPCID) 20 MG tablet TK 1 T PO HS  Refills: 1      hydrochlorothiazide (MICROZIDE) 12.5 mg capsule TAKE 1 CAPSULE(12.5 MG) BY MOUTH EVERY DAY  Qty: 90 capsule, Refills: 11    Comments: **Patient requests 90 days supply**      KIONEX, WITH SORBITOL, 15-19.3 gram/60 mL Susp SHAKE LIQUID AND TAKE 60 ML BY MOUTH TWICE DAILY  Qty: 2365 mL, Refills: 0      omeprazole (PRILOSEC) 20 MG capsule TK 1 C PO QD 30 MIN B JAISON  Refills: 1      oxybutynin (DITROPAN XL) 15 MG TR24 Take 1 tablet (15 mg total) by mouth once daily.  Qty: 30 tablet, Refills: 6      !! oxycodone-acetaminophen (PERCOCET)  mg per tablet TK 1 T PO  Q 6 H  "PRN  Refills: 0      trazodone (DESYREL) 50 MG tablet TK 1 T PO QD HS PRF INSOMNIA  Refills: 1      aspirin (ECOTRIN) 81 MG EC tablet Take 81 mg by mouth once daily.      BD ULTRA-FINE PHUONG PEN NEEDLES 32 gauge x 5/32" Ndle INJECTING TID  Refills: 2      gabapentin (NEURONTIN) 100 MG capsule TK 3 CS PO TID  Refills: 0      levocetirizine (XYZAL) 5 MG tablet       MYRBETRIQ 25 mg Tb24 ER tablet TAKE 1 T PO D  Refills: 11      nitrofurantoin, macrocrystal-monohydrate, (MACROBID) 100 MG capsule TK 2 C PO D WITH FOOD.  Refills: 11      !! oxycodone-acetaminophen (PERCOCET)  mg per tablet Take 1 tablet by mouth every 4 (four) hours as needed for Pain.  Qty: 14 tablet, Refills: 0      polyethylene glycol (GLYCOLAX) 17 gram PwPk Take 17 g by mouth once daily.  Qty: 30 packet, Refills: 0       !! - Potential duplicate medications found. Please discuss with provider.          Discharge Procedure Orders  Diet general     Activity as tolerated     Call MD for:  temperature >100.4     Call MD for:  persistent nausea and vomiting or diarrhea     Call MD for:  severe uncontrolled pain       Follow-up Information     Schedule an appointment as soon as possible for a visit with Rosa Isela Ta MD.    Specialty:  Urology    Why:  As needed    Contact information:    [address]  [facility] [address]  [phone]          As above.   "

## 2017-05-09 NOTE — INTERVAL H&P NOTE
The patient has been examined and the H&P has been reviewed:    I concur with the findings and no changes have occurred since H&P was written.   UCx from SP tube no growth per patient report    Anesthesia/Surgery risks, benefits and alternative options discussed and understood by patient/family.          Active Hospital Problems    Diagnosis  POA    Neurogenic bladder [N31.9]  Yes      Resolved Hospital Problems    Diagnosis Date Resolved POA   No resolved problems to display.     Patient's urine culture showed no growth.  Proceed with planned procedure.

## 2017-05-09 NOTE — PLAN OF CARE
Pt  given dc instructions and verbalized understanding.   0815- CN called Pt ride. Stated unable to  pt. States needs to bring her dad to a MRI. Will try to push back dad MRI. Will call back to notify. PT aaox3 and in no distress.   0834- Pt cousin called back to notify will come  at 0930.

## 2017-05-09 NOTE — TRANSFER OF CARE
"Anesthesia Transfer of Care Note    Patient: Vicente Kapoor    Procedure(s) Performed: Procedure(s) (LRB):  CYSTOSCOPY (N/A)  INJECTION-BOTOX (N/A)  INSERTION-CATHETER-SUPRAPUBIC (N/A)    Patient location: PACU    Transport from OR: Transported from OR on room air with adequate spontaneous ventilation    Post pain: adequate analgesia    Post assessment: no apparent anesthetic complications    Post vital signs: stable    Level of consciousness: awake and sedated    Nausea/Vomiting: no nausea/vomiting    Complications: none          Last vitals:   Visit Vitals    /78    Pulse (!) 59    Temp 36.9 °C (98.4 °F) (Temporal)    Resp 16    Ht 5' 11" (1.803 m)    Wt 74.8 kg (165 lb)    SpO2 99%    BMI 23.01 kg/m2     "

## 2017-05-09 NOTE — ANESTHESIA POSTPROCEDURE EVALUATION
"Anesthesia Post Evaluation    Patient: Vicente Kapoor    Procedure(s) Performed: Procedure(s) (LRB):  CYSTOSCOPY (N/A)  INJECTION-BOTOX (N/A)  INSERTION-CATHETER-SUPRAPUBIC (N/A)    Final Anesthesia Type: general  Patient location during evaluation: PACU  Patient participation: Yes- Able to Participate  Level of consciousness: awake and alert  Post-procedure vital signs: reviewed and stable  Pain management: adequate  Airway patency: patent  PONV status at discharge: No PONV  Anesthetic complications: no      Cardiovascular status: stable  Respiratory status: unassisted and spontaneous ventilation  Hydration status: euvolemic  Follow-up not needed.        Visit Vitals    /65    Pulse 86    Temp 36.9 °C (98.4 °F) (Temporal)    Resp 16    Ht 5' 11" (1.803 m)    Wt 74.8 kg (165 lb)    SpO2 95%    BMI 23.01 kg/m2       Pain/Bryce Score: Pain Assessment Performed: Yes (5/9/2017  7:48 AM)  Presence of Pain: denies (5/9/2017  7:48 AM)  Bryce Score: 9 (5/9/2017  7:48 AM)      " 0

## 2017-05-09 NOTE — DISCHARGE INSTRUCTIONS

## 2017-06-14 ENCOUNTER — OFFICE VISIT (OUTPATIENT)
Dept: UROLOGY | Facility: CLINIC | Age: 57
End: 2017-06-14
Payer: MEDICARE

## 2017-06-14 VITALS
BODY MASS INDEX: 23.8 KG/M2 | WEIGHT: 170 LBS | HEART RATE: 96 BPM | HEIGHT: 71 IN | SYSTOLIC BLOOD PRESSURE: 149 MMHG | DIASTOLIC BLOOD PRESSURE: 93 MMHG

## 2017-06-14 DIAGNOSIS — N39.41 URGE INCONTINENCE: Primary | ICD-10-CM

## 2017-06-14 DIAGNOSIS — N31.9 NEUROGENIC BLADDER: ICD-10-CM

## 2017-06-14 PROCEDURE — 99213 OFFICE O/P EST LOW 20 MIN: CPT | Mod: S$GLB,,, | Performed by: UROLOGY

## 2017-06-14 PROCEDURE — 99999 PR PBB SHADOW E&M-EST. PATIENT-LVL III: CPT | Mod: PBBFAC,,, | Performed by: UROLOGY

## 2017-06-14 NOTE — PROGRESS NOTES
CHIEF COMPLAINT:    Mr. Kapoor is a 56 y.o. male presenting for a follow up after cystoscopy Botox injection 300 U 5/9/2017.    PRESENTING ILLNESS:    Vicente Kapoor is a 56 y.o. male who returns stating that he had an episode of urinary incontinence per urethra.  He believes that this is due to the suprapubic tube getting kinked.  He continues on oxybutynin Xl 15 mg and Myrbetriq 25 mg.  His bp was elevated today but he did not take his meds since he did not eat.  Normally it is well controlled on medication.  Urinary symptoms are about 60% improved.      REVIEW OF SYSTEMS:    Review of Systems   Constitutional: Negative.    HENT: Negative.    Eyes: Negative.    Respiratory: Negative.    Cardiovascular: Negative.    Gastrointestinal: Positive for constipation.   Genitourinary:        Urge incontinence   Musculoskeletal: Positive for back pain.   Skin: Negative.    Neurological:        Paraplegic, spinal cord injury   Endo/Heme/Allergies: Negative.    Psychiatric/Behavioral: Negative.      PATIENT HISTORY:    Past Medical History:   Diagnosis Date    Diabetes mellitus     Hypertension     Neurogenic bladder     Osteomyelitis        Past Surgical History:   Procedure Laterality Date    below the knee amputation Right     COLOSTOMY Left     CYSTOSCOPY      PENILE PROSTHESIS  REMOVAL      PENILE PROSTHESIS IMPLANT      SUPRAPUBIC TUBE PLACEMENT  2008     No family history on file.    Social History    Marital status: Single     Social History Main Topics    Smoking status: Former Smoker     Packs/day: 0.50     Years: 35.00     Types: Cigarettes     Start date: 6/4/2016    Smokeless tobacco: Not on file    Alcohol use Yes      Comment: occasionally    Drug use: No    Sexual activity: Not on file       Allergies:  Review of patient's allergies indicates no known allergies.    Medications:  Outpatient Encounter Prescriptions as of 6/14/2017   Medication Sig Dispense Refill    aspirin (ECOTRIN) 81 MG EC  "tablet Take 81 mg by mouth once daily.      baclofen (LIORESAL) 10 MG tablet TK 1 T PO BID  1    BD ULTRA-FINE PHUONG PEN NEEDLES 32 gauge x 5/32" Ndle INJECTING TID  2    famotidine (PEPCID) 20 MG tablet TK 1 T PO HS  1    hydrochlorothiazide (MICROZIDE) 12.5 mg capsule TAKE 1 CAPSULE(12.5 MG) BY MOUTH EVERY DAY 90 capsule 11    KIONEX, WITH SORBITOL, 15-19.3 gram/60 mL Susp SHAKE LIQUID AND TAKE 60 ML BY MOUTH TWICE DAILY 2365 mL 0    levocetirizine (XYZAL) 5 MG tablet       nitrofurantoin, macrocrystal-monohydrate, (MACROBID) 100 MG capsule TK 2 C PO D WITH FOOD.  11    omeprazole (PRILOSEC) 20 MG capsule TK 1 C PO QD 30 MIN B JASION  1    oxybutynin (DITROPAN XL) 15 MG TR24 Take 1 tablet (15 mg total) by mouth once daily. 30 tablet 6    oxycodone-acetaminophen (PERCOCET)  mg per tablet TK 1 T PO  Q 6 H PRN  0    oxycodone-acetaminophen (PERCOCET)  mg per tablet Take 1 tablet by mouth every 4 (four) hours as needed for Pain. 14 tablet 0    amlodipine (NORVASC) 10 MG tablet TK 1 T PO QD  3    gabapentin (NEURONTIN) 100 MG capsule TK 3 CS PO TID  0    mirabegron (MYRBETRIQ) 50 mg Tb24 Take 1 tablet (50 mg total) by mouth once daily. 30 tablet 11    polyethylene glycol (GLYCOLAX) 17 gram PwPk Take 17 g by mouth once daily. 30 packet 0    trazodone (DESYREL) 50 MG tablet TK 1 T PO QD HS PRF INSOMNIA  1     No facility-administered encounter medications on file as of 6/14/2017.          PHYSICAL EXAMINATION:    The patient generally appears in good health, is appropriately interactive, and is in no apparent distress.    Skin: No lesions.    Mental: Cooperative with normal affect.    Neuro: Grossly intact.    HEENT: Normal. No evidence of lymphadenopathy.    Chest:  normal inspiratory effort.    Abdomen: Soft, non-tender. No masses or organomegaly. Bladder is not palpable. No evidence of flank discomfort. No evidence of inguinal hernia.    Extremities: No clubbing, cyanosis, or " edema      LABS:    Lab Results   Component Value Date    BUN 21 (H) 05/17/2016    CREATININE 0.7 05/17/2016     IMPRESSION:    Encounter Diagnoses   Name Primary?    Urge incontinence Yes    Neurogenic bladder        PLAN:    1.   Increased the dose of Myrbetriq from 25 to 50 mg  2.  He is already on oxybutynin 15 mg XL  3.  He will pay attention to the tube as it may be the tubing of the s/p tube getting kinked and not allowing the bladder to drain.    4.  Follow up when he needs the next Botox injection.

## 2017-06-27 RX ORDER — SODIUM POLYSTYRENE SULFONATE 15 G/60ML
SUSPENSION ORAL; RECTAL
Qty: 2365 ML | Refills: 0 | Status: SHIPPED | OUTPATIENT
Start: 2017-06-27

## 2017-07-21 RX ORDER — HYDROCHLOROTHIAZIDE 12.5 MG/1
CAPSULE ORAL
Qty: 30 CAPSULE | Refills: 0 | OUTPATIENT
Start: 2017-07-21

## 2017-08-29 ENCOUNTER — TELEPHONE (OUTPATIENT)
Dept: UROLOGY | Facility: CLINIC | Age: 57
End: 2017-08-29

## 2017-09-20 RX ORDER — SODIUM POLYSTYRENE SULFONATE 15 G/60ML
SUSPENSION ORAL; RECTAL
Qty: 2,365 ML | Refills: 0 | Status: CANCELLED | OUTPATIENT
Start: 2017-09-20

## 2017-09-21 RX ORDER — SODIUM POLYSTYRENE SULFONATE 15 G/60ML
SUSPENSION ORAL; RECTAL
Qty: 2,365 ML | Refills: 0 | Status: CANCELLED | OUTPATIENT
Start: 2017-09-21

## 2017-09-26 DIAGNOSIS — E87.5 HYPERKALEMIA: Primary | ICD-10-CM

## 2017-10-06 DIAGNOSIS — E87.5 HYPERKALEMIA: Primary | ICD-10-CM

## 2017-10-10 ENCOUNTER — TELEPHONE (OUTPATIENT)
Dept: NEPHROLOGY | Facility: CLINIC | Age: 57
End: 2017-10-10

## 2017-10-10 NOTE — TELEPHONE ENCOUNTER
Called pt informed that Dr. Suarez would like for him to go to the ED for K+ 6.7 pt stated that his was taking kayexalate once daily and only for 2 days pre labs  , I spoke with Dr. Suarez about this and stated that he would prefer pt go to  the ED but pt can try taking kayexalate twice daily with plenty fluids ,pt agreed to this and stated if he starts to feel bad he will go to ED

## 2017-10-20 ENCOUNTER — TELEPHONE (OUTPATIENT)
Dept: NEPHROLOGY | Facility: CLINIC | Age: 57
End: 2017-10-20

## 2017-10-20 NOTE — TELEPHONE ENCOUNTER
----- Message from Divya Alvarez sent at 10/20/2017  1:21 PM CDT -----  Contact: pt  x_ 1st Request  _ 2nd Request  _ 3rd Request    Who: pt    Why: is returning a call from staff    What Number to Call Back: [phone]    When to Expect a call back: (Before the end of the day)  -- if call after 3:00 call back will be tomorrow.     Spoke to pt and informed him that per Dr. Suarez to not take kayexalate today and tomorrow then start taken it every other day after that with a K+ level in one week /pt verbalized understanding to all info given

## 2017-10-20 NOTE — TELEPHONE ENCOUNTER
----- Message from Divya Alvarez sent at 10/20/2017  1:21 PM CDT -----  Contact: pt  x_ 1st Request  _ 2nd Request  _ 3rd Request    Who: pt    Why: is returning a call from staff    What Number to Call Back: [phone]    When to Expect a call back: (Before the end of the day)  -- if call after 3:00 call back will be tomorrow.

## 2017-10-20 NOTE — TELEPHONE ENCOUNTER
Patient was informed via nurse to hold Kayaxalate for 2 days and then take once every to days to have 1-2 bowl movements a day. Potassium check next week.

## 2017-10-24 ENCOUNTER — TELEPHONE (OUTPATIENT)
Dept: NEPHROLOGY | Facility: CLINIC | Age: 57
End: 2017-10-24

## 2017-10-24 NOTE — TELEPHONE ENCOUNTER
RIKA Graff LPN  Caller: Brittany brumfield/ [facility]  tel:    [phone]          Please check the fax to see if labs came across for this pt and give to Dr. Kovacs   Previous Messages        ----- Message -----   From: Shelly Kovacs MD   Sent: 10/24/2017   4:12 PM   To: Nancy Hong LPN     Need to see entire lab report including creatinine. Who has received the fax? All I can say is he needs more water intake but cannot be certain until the entire report can be reviewed.     I will not be in the office from tomorrow onwards...     ----- Message -----   From: Nancy Hong LPN   Sent: 10/24/2017   2:39 PM   To: Shelly Kovacs MD     Pt had outside labs from home health care drawn and his sodium was 147, please advise   ----- Message -----   From: Shelly oKvacs MD   Sent: 10/24/2017  12:36 PM   To: Nancy Hong LPN     Please clarify what is his question. I don't see any recent labs.     ----- Message -----   From: Nancy Hong LPN   Sent: 10/24/2017   9:09 AM   To: Shelly Kovacs MD     Please advise ,as per Dr. Suarez is on CME   ----- Message -----   From: Roro Ramirez   Sent: 10/24/2017   8:57 AM   To: Erick Ivo Staff     Lab results were faxed to you.   Asking for the nurse to pls call ref. This.   The sodium is 147.     Spoke to pt and informed him per Dr. Kovacs to increase his fluid intake due to sodium being 147/ pt verbalized understanding

## 2017-10-27 RX ORDER — OXYBUTYNIN CHLORIDE 15 MG/1
15 TABLET, EXTENDED RELEASE ORAL DAILY
Qty: 30 TABLET | Refills: 6 | Status: SHIPPED | OUTPATIENT
Start: 2017-10-27 | End: 2018-05-23 | Stop reason: SDUPTHER

## 2017-12-05 ENCOUNTER — OFFICE VISIT (OUTPATIENT)
Dept: NEPHROLOGY | Facility: CLINIC | Age: 57
End: 2017-12-05
Payer: MEDICARE

## 2017-12-05 VITALS
OXYGEN SATURATION: 95 % | HEIGHT: 71 IN | WEIGHT: 175 LBS | SYSTOLIC BLOOD PRESSURE: 140 MMHG | BODY MASS INDEX: 24.5 KG/M2 | HEART RATE: 83 BPM | DIASTOLIC BLOOD PRESSURE: 98 MMHG

## 2017-12-05 DIAGNOSIS — I10 ESSENTIAL HYPERTENSION: ICD-10-CM

## 2017-12-05 DIAGNOSIS — Z79.4 DIABETES MELLITUS DUE TO UNDERLYING CONDITION WITH STAGE 1 CHRONIC KIDNEY DISEASE, WITH LONG-TERM CURRENT USE OF INSULIN: ICD-10-CM

## 2017-12-05 DIAGNOSIS — E08.22 DIABETES MELLITUS DUE TO UNDERLYING CONDITION WITH STAGE 1 CHRONIC KIDNEY DISEASE, WITH LONG-TERM CURRENT USE OF INSULIN: ICD-10-CM

## 2017-12-05 DIAGNOSIS — N18.1 DIABETES MELLITUS DUE TO UNDERLYING CONDITION WITH STAGE 1 CHRONIC KIDNEY DISEASE, WITH LONG-TERM CURRENT USE OF INSULIN: ICD-10-CM

## 2017-12-05 DIAGNOSIS — E87.5 HYPERKALEMIA: Primary | ICD-10-CM

## 2017-12-05 PROCEDURE — 99999 PR PBB SHADOW E&M-EST. PATIENT-LVL IV: CPT | Mod: PBBFAC,,, | Performed by: INTERNAL MEDICINE

## 2017-12-05 PROCEDURE — 99214 OFFICE O/P EST MOD 30 MIN: CPT | Mod: S$GLB,,, | Performed by: INTERNAL MEDICINE

## 2017-12-05 RX ORDER — HYDROCHLOROTHIAZIDE 25 MG/1
25 TABLET ORAL DAILY
Qty: 90 TABLET | Refills: 3 | Status: SHIPPED | OUTPATIENT
Start: 2017-12-05 | End: 2018-01-04

## 2017-12-05 NOTE — PROGRESS NOTES
Subjective:       Patient ID: Vicente Kapoor is a 57 y.o. Black or  male who presents for new evaluation of hyperkalemia    HPI   56 y/o male with hx of DM2 x 13yrs, HTN x 13yrs, PVD, right AKA, suprapubic abbasi catheter, chronic osteomyelitis(back) on chronic IV abx (ertapenem via a right sided port) followed at [facility] who presents for a follow up evaluation of chronic intermittent hyperkalemia, last episode last month with a potassium above 6.0. He is wheelchair bound. He denies any chest pain, shortness of breath. His appetite is good and denies any nausea, vomiting, diarrhea, abdominal pain, melena or bright red bleeding per rectum. His bowel movements are regular (twice a day with kayexalate) and his weight is stable. Has a colostomy because of a chronic decubitus (to avoid infections). He urinates via suprapubic catheter and denied hematuria.  Refers that is not following low potassium diet.     Review of Systems    Constitutional: Negative for fatigue.   Eyes: Negative for discharge.   Respiratory: Negative for cough, shortness of breath and wheezing.   Cardiovascular: Negative for chest pain and palpitations.   Gastrointestinal: Negative for nausea, vomiting, abdominal pain and diarrhea.   Genitourinary: Negative hematuria. Suprapubic catheter.    Skin: Negative for color change and rash.   Psychiatric/Behavioral: Negative for confusion.    Objective:      Physical Exam    Gen: AAOx3, NAD  HEENT: mmm  Neck: no bruit, no JVD  CV: RRR, no m/r  Resp: CTAx2, normal effort  GI: soft, ND, NTTP, +BS, colostomy   : has suprapubic cather  Extr: right AKA, left foot dressing/wrapped   Neuro: normal reflexes, no focal deficits    Assessment:       1. Hyperkalemia    2. Essential hypertension    3. Diabetes mellitus due to underlying condition with stage 1 chronic kidney disease, with long-term current use of insulin        Plan:       1. CKD1: Patient with CKD stage 1-2 likely 2/2 DM.  Currently remains at baseline levels around 0.5-0.7.  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 Couldn't be started on ACE/ARB at this moment due to hyperkalemia.   Will repeat  Prot/Creat Ratio, Ur   Date Value Ref Range Status   05/17/2016 1.20 (H) 0.00 - 0.20 Final     ·   ·   Acid-Base: Patient still presenting with hyperkalemia on laboratories. Will give kayexalate and will start making arrangements to start patient on chronic veltassa.   Lab Results   Component Value Date     05/17/2016    K 4.2 05/17/2016    CO2 24 05/17/2016     2. HTN: Blood pressures adequate. No changes needed.     3. Renal osteodystrophy: Will order PTH and vitamin D for next visit.   Lab Results   Component Value Date    CALCIUM 9.1 05/17/2016    PHOS 2.9 05/17/2016       4. Anemia: Patient H/H has been dropping lately. Will order laboratories to evaluate for possible hemolysis that could explain the patient constant hyperkalemia.   Lab Results   Component Value Date    HGB 9.0 (L) 05/17/2016        5. DM:  Further follow up as per primary physician.   Lab Results   Component Value Date    HGBA1C 6.0 02/13/2009        RTC in 3 month, labs for potassium in 2 wks.

## 2018-01-25 ENCOUNTER — TELEPHONE (OUTPATIENT)
Dept: NEPHROLOGY | Facility: CLINIC | Age: 58
End: 2018-01-25

## 2018-01-25 NOTE — TELEPHONE ENCOUNTER
----- Message from Sneha Suarez MD sent at 1/24/2018  9:20 PM CST -----  Contact: Brittany brumfield/ [facility]  tel:  [phone]  Yes, he should take it daily. Thank you.  ----- Message -----  From: Nancy Hong LPN  Sent: 1/24/2018   2:08 PM  To: Sneah Suarez MD        ----- Message -----  From: Roro Ramirez  Sent: 1/24/2018  10:29 AM  To: Erick Hoover Staff    The potassium is 5/4.      Has been taking the medication for this every other day.   Did you want to change the order on this?   Pls call.     Called and left message for Brittany

## 2018-02-16 RX ORDER — TRAZODONE HYDROCHLORIDE 50 MG/1
TABLET ORAL
Qty: 90 TABLET | Refills: 1 | Status: SHIPPED | OUTPATIENT
Start: 2018-02-16 | End: 2018-08-28 | Stop reason: SDUPTHER

## 2018-03-13 ENCOUNTER — TELEPHONE (OUTPATIENT)
Dept: NEPHROLOGY | Facility: CLINIC | Age: 58
End: 2018-03-13

## 2018-04-17 DIAGNOSIS — B17.10 ACUTE HEPATITIS C: Primary | ICD-10-CM

## 2018-04-26 ENCOUNTER — OFFICE VISIT (OUTPATIENT)
Dept: NEPHROLOGY | Facility: CLINIC | Age: 58
End: 2018-04-26
Payer: MEDICARE

## 2018-04-26 VITALS
DIASTOLIC BLOOD PRESSURE: 100 MMHG | SYSTOLIC BLOOD PRESSURE: 140 MMHG | OXYGEN SATURATION: 95 % | HEIGHT: 71 IN | HEART RATE: 66 BPM

## 2018-04-26 DIAGNOSIS — E87.5 HYPERKALEMIA: Primary | ICD-10-CM

## 2018-04-26 DIAGNOSIS — N31.9 NEUROGENIC BLADDER: ICD-10-CM

## 2018-04-26 DIAGNOSIS — I10 ESSENTIAL HYPERTENSION: ICD-10-CM

## 2018-04-26 PROCEDURE — 3080F DIAST BP >= 90 MM HG: CPT | Mod: CPTII,S$GLB,, | Performed by: INTERNAL MEDICINE

## 2018-04-26 PROCEDURE — 99999 PR PBB SHADOW E&M-EST. PATIENT-LVL IV: CPT | Mod: PBBFAC,,, | Performed by: INTERNAL MEDICINE

## 2018-04-26 PROCEDURE — 3077F SYST BP >= 140 MM HG: CPT | Mod: CPTII,S$GLB,, | Performed by: INTERNAL MEDICINE

## 2018-04-26 PROCEDURE — 99213 OFFICE O/P EST LOW 20 MIN: CPT | Mod: S$GLB,,, | Performed by: INTERNAL MEDICINE

## 2018-04-26 RX ORDER — FUROSEMIDE 20 MG/1
20 TABLET ORAL DAILY
Qty: 90 TABLET | Refills: 11 | Status: SHIPPED | OUTPATIENT
Start: 2018-04-26 | End: 2018-07-31 | Stop reason: SDUPTHER

## 2018-04-26 RX ORDER — MEROPENEM 500 MG/1
INJECTION, POWDER, FOR SOLUTION INTRAVENOUS
COMMUNITY
Start: 2018-03-23

## 2018-04-26 NOTE — PROGRESS NOTES
Subjective:       Patient ID: Vicente Kapoor is a 57 y.o. Black or  male who presents for new evaluation of hyperkalemia    HPI   54 y/o male with hx of DM2 x 13yrs, HTN x 13yrs, PVD, right AKA, suprapubic abbasi catheter, chronic osteomyelitis(back) on chronic IV abx (ertapenem via a right sided port) followed at [facility] who presents for a follow up evaluation of chronic intermittent hyperkalemia, last episode last month with a potassium above 6.0. He is wheelchair bound. He denies any chest pain, shortness of breath. His appetite is good and denies any nausea, vomiting, diarrhea, abdominal pain, melena or bright red bleeding per rectum. His bowel movements are regular (twice a day with kayexalate) and his weight is stable. Has a colostomy because of a chronic decubitus (to avoid infections). He urinates via suprapubic catheter and denied hematuria.  Refers that is not following low potassium diet. He still has a problem with chronic hyperkalemia always when he does not take his Kayexalate. He has a port for his ertapenem he gets daily for his osteomyelitis.   Recently was admitted for observation because of SOB and edema, schedule for echo.    Review of Systems    Constitutional: Negative for fatigue.   Eyes: Negative for discharge.   Respiratory:  No shortness of breath and wheezing.   Cardiovascular: Negative for chest pain and palpitations.   Gastrointestinal: colostomy bag  Genitourinary: Negative hematuria. Suprapubic catheter.    Skin: Negative for color change and rash.   Psychiatric/Behavioral: Negative for confusion.    Objective:      Physical Exam    Gen: AAOx3, NAD  HEENT: mmm  Neck: no bruit, no JVD  CV: RRR, no m/r  Resp: CTAx2, normal effort  GI: soft, ND, NTTP, +BS, colostomy   : has suprapubic cather  Extr: right AKA, left foot dressing/wrapped   Neuro: normal reflexes, no focal deficits    Assessment:       1. Hyperkalemia    2. Neurogenic bladder    3. Essential  hypertension        Plan:       1. CKD1: Patient with CKD stage 1-2 likely 2/2 DM. Currently remains at baseline levels around 0.5-0.7.  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 Couldn't be started on ACE/ARB at this moment due to hyperkalemia.   Will repeat  Prot/Creat Ratio, Ur   Date Value Ref Range Status   05/17/2016 1.20 (H) 0.00 - 0.20 Final     ·   ·   Acid-Base: Patient still presenting with hyperkalemia on laboratories. Will give kayexalate and will start making arrangements to start patient on chronic veltassa.   Lab Results   Component Value Date     05/17/2016    K 4.2 05/17/2016    CO2 24 05/17/2016     2. HTN: Blood pressures adequate.      3. Renal osteodystrophy: Will order PTH and vitamin D for next visit.   Lab Results   Component Value Date    CALCIUM 9.1 05/17/2016    PHOS 2.9 05/17/2016       4. Anemia:  Last hbg was 10.0    5. DM:  Further follow up as per primary physician.   Lab Results   Component Value Date    HGBA1C 6.0 02/13/2009        RTC in 3 month, labs for potassium in 2 wks.

## 2018-05-02 ENCOUNTER — HOSPITAL ENCOUNTER (OUTPATIENT)
Dept: RADIOLOGY | Facility: OTHER | Age: 58
Discharge: HOME | End: 2018-05-02
Attending: INTERNAL MEDICINE
Payer: MEDICARE

## 2018-05-02 DIAGNOSIS — B17.10 ACUTE HEPATITIS C: ICD-10-CM

## 2018-05-02 PROCEDURE — 76700 US EXAM ABDOM COMPLETE: CPT | Mod: TC

## 2018-05-02 PROCEDURE — 76700 US EXAM ABDOM COMPLETE: CPT | Mod: 26,,, | Performed by: RADIOLOGY

## 2018-05-23 RX ORDER — OXYBUTYNIN CHLORIDE 15 MG/1
15 TABLET, EXTENDED RELEASE ORAL DAILY
Qty: 30 TABLET | Refills: 0 | Status: SHIPPED | OUTPATIENT
Start: 2018-05-23 | End: 2018-07-08 | Stop reason: SDUPTHER

## 2018-06-28 DIAGNOSIS — N39.41 URGE INCONTINENCE: ICD-10-CM

## 2018-06-28 DIAGNOSIS — N31.9 NEUROGENIC BLADDER: ICD-10-CM

## 2018-07-09 RX ORDER — OXYBUTYNIN CHLORIDE 15 MG/1
TABLET, EXTENDED RELEASE ORAL
Qty: 90 TABLET | Refills: 0 | OUTPATIENT
Start: 2018-07-09

## 2018-07-09 RX ORDER — OXYBUTYNIN CHLORIDE 15 MG/1
TABLET, EXTENDED RELEASE ORAL
Qty: 30 TABLET | Refills: 0 | Status: SHIPPED | OUTPATIENT
Start: 2018-07-09

## 2018-07-31 ENCOUNTER — LAB VISIT (OUTPATIENT)
Dept: LAB | Facility: HOSPITAL | Age: 58
End: 2018-07-31
Attending: INTERNAL MEDICINE
Payer: MEDICARE

## 2018-07-31 ENCOUNTER — OFFICE VISIT (OUTPATIENT)
Dept: NEPHROLOGY | Facility: CLINIC | Age: 58
End: 2018-07-31
Payer: MEDICARE

## 2018-07-31 VITALS
OXYGEN SATURATION: 94 % | HEIGHT: 71 IN | HEART RATE: 98 BPM | WEIGHT: 175 LBS | BODY MASS INDEX: 24.5 KG/M2 | DIASTOLIC BLOOD PRESSURE: 90 MMHG | SYSTOLIC BLOOD PRESSURE: 110 MMHG

## 2018-07-31 DIAGNOSIS — E08.22 DIABETES MELLITUS DUE TO UNDERLYING CONDITION WITH STAGE 1 CHRONIC KIDNEY DISEASE, WITH LONG-TERM CURRENT USE OF INSULIN: ICD-10-CM

## 2018-07-31 DIAGNOSIS — Z79.4 DIABETES MELLITUS DUE TO UNDERLYING CONDITION WITH STAGE 1 CHRONIC KIDNEY DISEASE, WITH LONG-TERM CURRENT USE OF INSULIN: ICD-10-CM

## 2018-07-31 DIAGNOSIS — N18.1 DIABETES MELLITUS DUE TO UNDERLYING CONDITION WITH STAGE 1 CHRONIC KIDNEY DISEASE, WITH LONG-TERM CURRENT USE OF INSULIN: ICD-10-CM

## 2018-07-31 DIAGNOSIS — N31.9 NEUROGENIC BLADDER: ICD-10-CM

## 2018-07-31 DIAGNOSIS — D50.8 OTHER IRON DEFICIENCY ANEMIA: ICD-10-CM

## 2018-07-31 DIAGNOSIS — R80.9 PROTEINURIA, UNSPECIFIED TYPE: ICD-10-CM

## 2018-07-31 DIAGNOSIS — E87.5 HYPERKALEMIA: ICD-10-CM

## 2018-07-31 DIAGNOSIS — I10 ESSENTIAL HYPERTENSION: Primary | ICD-10-CM

## 2018-07-31 DIAGNOSIS — N18.1 CKD (CHRONIC KIDNEY DISEASE), STAGE I: ICD-10-CM

## 2018-07-31 LAB
CREAT UR-MCNC: 49 MG/DL
PROT UR-MCNC: 100 MG/DL
PROT/CREAT UR: 2.04 MG/G{CREAT}

## 2018-07-31 PROCEDURE — 84156 ASSAY OF PROTEIN URINE: CPT

## 2018-07-31 PROCEDURE — 99214 OFFICE O/P EST MOD 30 MIN: CPT | Mod: S$GLB,,, | Performed by: INTERNAL MEDICINE

## 2018-07-31 PROCEDURE — 3008F BODY MASS INDEX DOCD: CPT | Mod: CPTII,S$GLB,, | Performed by: INTERNAL MEDICINE

## 2018-07-31 PROCEDURE — 99999 PR PBB SHADOW E&M-EST. PATIENT-LVL III: CPT | Mod: PBBFAC,,, | Performed by: INTERNAL MEDICINE

## 2018-07-31 PROCEDURE — 3080F DIAST BP >= 90 MM HG: CPT | Mod: CPTII,S$GLB,, | Performed by: INTERNAL MEDICINE

## 2018-07-31 PROCEDURE — 3074F SYST BP LT 130 MM HG: CPT | Mod: CPTII,S$GLB,, | Performed by: INTERNAL MEDICINE

## 2018-07-31 RX ORDER — FUROSEMIDE 20 MG/1
20 TABLET ORAL 2 TIMES DAILY
Qty: 90 TABLET | Refills: 11 | Status: SHIPPED | OUTPATIENT
Start: 2018-07-31 | End: 2019-07-31

## 2018-07-31 NOTE — PROGRESS NOTES
Subjective:       Patient ID: Vicente Kapoor is a 57 y.o. Black or  male who presents for new evaluation of hyperkalemia    HPI   56 y/o male with hx of DM2 x 13yrs, HTN x 13yrs, PVD, right AKA, suprapubic abbasi catheter, chronic osteomyelitis(back) on chronic IV abx (still on ertapenem via a right sided port) followed at [facility] who presents for a follow up evaluation of chronic intermittent hyperkalemia and on chronic kayexalate.He is wheelchair bound. He denies any chest pain, shortness of breath. His appetite is good and denies any nausea, vomiting, diarrhea, abdominal pain, melena or bright red bleeding per rectum. His bowel movements are regular (twice a day with kayexalate) and his weight is stable. Has a colostomy because of a chronic decubitus (to avoid infections). He urinates via suprapubic catheter and denied hematuria.  Refers that is not following low potassium diet. He still has a problem with chronic hyperkalemia always when he does not take his Kayexalate. He drinks a lot of juice a day.    Has a chronically high C-reactive protein. Urine looks clear in am and darker during pm.     Review of Systems    Constitutional: Negative for fatigue.   Eyes: Negative for discharge.   Respiratory:  No shortness of breath and wheezing.   Cardiovascular: Negative for chest pain and palpitations.   Gastrointestinal: colostomy bag  Genitourinary: Negative hematuria. Suprapubic catheter.    Skin: Negative for color change and rash.   Psychiatric/Behavioral: Negative for confusion.    Objective:      Physical Exam    Gen: AAOx3, NAD  HEENT: mmm  Neck: no bruit, no JVD  CV: RRR, no m/r  Resp: CTAx2, normal effort  GI: soft, ND, NTTP, +BS, colostomy   : has suprapubic cather  Extr: right AKA, left foot dressing/wrapped   Neuro: normal reflexes, no focal deficits    Assessment:       1. Essential hypertension    2. Neurogenic bladder    3. CKD (chronic kidney disease), stage I    4.  Proteinuria, unspecified type        Plan:       1. CKD1: Patient with CKD stage 1-2 likely 2/2 DM. His last creatinine was 0.8mg/dl.   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   - will start him on po low dose furosemide in addition to his HCTZ to treat the swelling and hyperkalemia - last potassium was within normal limits (not on kayexalate for one week)  - increase the lasix for his hyperkalemia and volume overload to bid    Lab Results   Component Value Date    CREATININE 0.7 05/17/2016     Protein Creatinine Ratios: Paitient with non nephrotic range proteinuria on laboratories. Couldn't be started on ACE/ARB at this moment due to hyperkalemia.   Will repeat  Prot/Creat Ratio, Ur   Date Value Ref Range Status   05/17/2016 1.20 (H) 0.00 - 0.20 Final     ·   ·   Acid-Base: Last potassium within range  - educated not to drink fruit juices   - increase lasix 20mg to bid because of volume overload.     2. HTN: Blood pressures adequate.      3. Renal osteodystrophy: Will order PTH and vitamin D for next visit.   Lab Results   Component Value Date    CALCIUM 9.1 05/17/2016    PHOS 2.9 05/17/2016       4. Anemia:  Last hbg was 9.0 mg/dl, needs iron stores    5. DM:  Further follow up as per primary physician.   Lab Results   Component Value Date    HGBA1C 6.0 02/13/2009        RTC in 3 month, labs for potassium every 4 wks, pro/crea ratio today

## 2018-08-07 DIAGNOSIS — N39.41 URGE INCONTINENCE: ICD-10-CM

## 2018-08-07 DIAGNOSIS — N31.9 NEUROGENIC BLADDER: ICD-10-CM

## 2018-08-08 RX ORDER — MIRABEGRON 50 MG/1
TABLET, FILM COATED, EXTENDED RELEASE ORAL
Qty: 30 TABLET | Refills: 0 | Status: SHIPPED | OUTPATIENT
Start: 2018-08-08

## 2018-08-16 RX ORDER — OXYBUTYNIN CHLORIDE 15 MG/1
TABLET, EXTENDED RELEASE ORAL
Qty: 30 TABLET | Refills: 0 | OUTPATIENT
Start: 2018-08-16

## 2018-08-28 DIAGNOSIS — N18.1 CHRONIC KIDNEY DISEASE, STAGE I: Primary | ICD-10-CM

## 2018-08-29 RX ORDER — TRAZODONE HYDROCHLORIDE 50 MG/1
TABLET ORAL
Qty: 90 TABLET | Refills: 1 | Status: SHIPPED | OUTPATIENT
Start: 2018-08-29

## 2019-03-18 ENCOUNTER — TELEPHONE (OUTPATIENT)
Dept: NEPHROLOGY | Facility: CLINIC | Age: 59
End: 2019-03-18
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
## (undated) DEVICE — PACK CYSTO

## (undated) DEVICE — SYR 10CC LUER LOCK

## (undated) DEVICE — TRAY CYSTO BASIN

## (undated) DEVICE — NDL SPINAL SPINOCAN 22GX3.5

## (undated) DEVICE — NDL BLUNT TIP 16GX1/2

## (undated) DEVICE — NDL SPINAL 22GA X 3 1/2 IN

## (undated) DEVICE — NDL 25GA 5FR 35MM

## (undated) DEVICE — SEE MEDLINE ITEM 152487

## (undated) DEVICE — SOL IRR WATER STRL 3000 ML

## (undated) DEVICE — SEE MEDLINE ITEM 152186

## (undated) DEVICE — GOWN SURG  X-LG

## (undated) DEVICE — Device

## (undated) DEVICE — SYR TB SLIP TIP W/O NDL 1ML

## (undated) DEVICE — BAG DRAIN URINE 2500ML

## (undated) DEVICE — SET CYSTO IRRIGATION UNIV SPIK

## (undated) DEVICE — SET CYSTO IRRIGATING

## (undated) DEVICE — GOWN SURGICAL X-LARGE